# Patient Record
Sex: MALE | Race: WHITE | URBAN - METROPOLITAN AREA
[De-identification: names, ages, dates, MRNs, and addresses within clinical notes are randomized per-mention and may not be internally consistent; named-entity substitution may affect disease eponyms.]

---

## 2020-07-08 ENCOUNTER — OFFICE VISIT (OUTPATIENT)
Dept: URBAN - METROPOLITAN AREA CLINIC 74 | Facility: CLINIC | Age: 45
End: 2020-07-08
Payer: COMMERCIAL

## 2020-07-08 DIAGNOSIS — Z90.49 HISTORY OF CHOLECYSTECTOMY: ICD-10-CM

## 2020-07-08 DIAGNOSIS — R68.81 EARLY SATIETY: ICD-10-CM

## 2020-07-08 DIAGNOSIS — K31.84 GASTROPARESIS: ICD-10-CM

## 2020-07-08 DIAGNOSIS — K58.9 IRRITABLE BOWEL SYNDROME: ICD-10-CM

## 2020-07-08 DIAGNOSIS — K90.41 GLUTEN INTOLERANCE: ICD-10-CM

## 2020-07-08 DIAGNOSIS — K59.01 SLOW TRANSIT CONSTIPATION: ICD-10-CM

## 2020-07-08 DIAGNOSIS — K59.09 COLONIC CONSTIPATION: ICD-10-CM

## 2020-07-08 PROCEDURE — 1036F TOBACCO NON-USER: CPT | Performed by: INTERNAL MEDICINE

## 2020-07-08 PROCEDURE — G8427 DOCREV CUR MEDS BY ELIG CLIN: HCPCS | Performed by: INTERNAL MEDICINE

## 2020-07-08 PROCEDURE — 99213 OFFICE O/P EST LOW 20 MIN: CPT | Performed by: INTERNAL MEDICINE

## 2020-07-08 PROCEDURE — G8419 CALC BMI OUT NRM PARAM NOF/U: HCPCS | Performed by: INTERNAL MEDICINE

## 2020-07-08 RX ORDER — BETHANECHOL CHLORIDE 25 MG/1
1 TABLET 1 HOUR BEFORE OR 2 HOURS AFTER MEALS TABLET ORAL THREE TIMES A DAY
Qty: 90 | Refills: 3 | OUTPATIENT
Start: 2020-07-08

## 2020-07-08 RX ORDER — PREDNISOLONE ORAL 15 MG/5ML
SOLUTION ORAL
Qty: 0 | Refills: 0 | Status: DISCONTINUED | COMMUNITY
Start: 1900-01-01

## 2020-07-08 NOTE — HPI-TODAY'S VISIT:
Today July 8, 2020 the patient returns for a follow-up visit, the patient was last seen in the office February 10, 2020, at that time he claimed that starting a gluten-free diet resolved all his problems including abdominal bloating, abdominal pain, malaise, joint swelling and pain. Currently the patient states that the gastroparesis has become a problem again, the patient was off Urecholine and is now having more regurgitation, early satiety, or occasional nausea with no vomiting.  The patient will restart Urecholine 25 mg 45 minutes cc, he will return to the office in 3 months or as needed.

## 2020-07-08 NOTE — PHYSICAL EXAM CONSTITUTIONAL:
Overweight, well developed, well nourished , in no acute distress , ambulating without difficulty , normal communication ability

## 2021-05-05 ENCOUNTER — OFFICE VISIT (OUTPATIENT)
Dept: URBAN - METROPOLITAN AREA CLINIC 74 | Facility: CLINIC | Age: 46
End: 2021-05-05
Payer: COMMERCIAL

## 2021-05-05 ENCOUNTER — LAB OUTSIDE AN ENCOUNTER (OUTPATIENT)
Dept: URBAN - METROPOLITAN AREA CLINIC 74 | Facility: CLINIC | Age: 46
End: 2021-05-05

## 2021-05-05 ENCOUNTER — WEB ENCOUNTER (OUTPATIENT)
Dept: URBAN - METROPOLITAN AREA CLINIC 74 | Facility: CLINIC | Age: 46
End: 2021-05-05

## 2021-05-05 VITALS
DIASTOLIC BLOOD PRESSURE: 70 MMHG | HEART RATE: 78 BPM | BODY MASS INDEX: 25.54 KG/M2 | SYSTOLIC BLOOD PRESSURE: 119 MMHG | OXYGEN SATURATION: 99 % | WEIGHT: 178.4 LBS | TEMPERATURE: 97.5 F | HEIGHT: 70 IN

## 2021-05-05 DIAGNOSIS — R11.0 NAUSEA: ICD-10-CM

## 2021-05-05 DIAGNOSIS — R14.0 BLOATING: ICD-10-CM

## 2021-05-05 DIAGNOSIS — R68.81 EARLY SATIETY: ICD-10-CM

## 2021-05-05 DIAGNOSIS — K59.09 COLONIC CONSTIPATION: ICD-10-CM

## 2021-05-05 DIAGNOSIS — G89.29 OTHER CHRONIC PAIN: ICD-10-CM

## 2021-05-05 DIAGNOSIS — K58.9 IRRITABLE BOWEL SYNDROME: ICD-10-CM

## 2021-05-05 DIAGNOSIS — K90.41 GLUTEN INTOLERANCE: ICD-10-CM

## 2021-05-05 DIAGNOSIS — K29.30 CHRONIC SUPERFICIAL GASTRITIS WITHOUT BLEEDING: ICD-10-CM

## 2021-05-05 DIAGNOSIS — K31.84 GASTROPARESIS: ICD-10-CM

## 2021-05-05 PROCEDURE — 99214 OFFICE O/P EST MOD 30 MIN: CPT | Performed by: INTERNAL MEDICINE

## 2021-05-05 RX ORDER — PREDNISONE 20 MG/1
1 TABLET TABLET ORAL ONCE A DAY
Status: ACTIVE | COMMUNITY

## 2021-05-05 RX ORDER — BETHANECHOL CHLORIDE 25 MG/1
1 TABLET 1 HOUR BEFORE OR 2 HOURS AFTER MEALS TABLET ORAL THREE TIMES A DAY
Qty: 90 | Refills: 3 | Status: ACTIVE | COMMUNITY
Start: 2020-07-08

## 2021-05-05 RX ORDER — BETHANECHOL CHLORIDE 25 MG/1
1 TABLET 1 HOUR BEFORE OR 2 HOURS AFTER MEALS TABLET ORAL THREE TIMES A DAY
OUTPATIENT
Start: 2020-07-08

## 2021-05-05 RX ORDER — SULFASALAZINE 500 MG/1
1 TABLET TABLET ORAL TWICE A DAY
Status: ACTIVE | COMMUNITY

## 2021-05-05 NOTE — HPI-TODAY'S VISIT:
Today May 5, 2021 the patient returns for a follow-up visit, the patient was last seen in the office on February 10, 2020 with gastroparesis, history of gastritis, gluten intolerance, history of cholecystectomy, chronic pain and the patient was overweight. At the time of the February 10, 2020 visit the patient returns stating that he did see an internal medicine specialist who changed his diet to a gluten-free diet and was doing very well ever since. The patient denied having any abdominal bloating, pain, malaise, joint swelling or pain.  The patient agreed to remain on the diet and return only as needed. Today the patient returns to the office stating that his gastroparesis might be worse, over the last weeks the patient has developed increasing epigastric bloating and postprandial early satiety followed by nausea, he vomited once retained gastric contents.  The patient states that he has been taking the Urecholine 25 mg before meals and at bedtime with no improvement. The patient was recently seen in the emergency room on April 13, 2021 with flank pain at the time the patient had a white cell count of 5.79, hemoglobin 14.4, hematocrit 45.7, the differential showed a decreased absolute lymphocyte count of 0.51, his electrolytes were normal, his glucose was 122, EGFR was normal, there was on a CT scan slight enlargement of left renal cysts with no hydronephrosis or nephrolithiasis, the stomach, small bowel, colon appeared to be normal.  The vasculature was normal and there were no enlarged lymph nodes. The patient states that he had transient constipation and that is gradually getting better. The patient will be recommended to have a nuclear gastric emptying study before we make a change from Urecholine to Reglan, we discussed potential side effects related to the use of Reglan. The patient will return for a follow-up visit after he completes the gastric emptying study.

## 2021-05-05 NOTE — PHYSICAL EXAM CONSTITUTIONAL:
Overweight, well developed, well nourished, in no acute distress, ambulating without difficulty, normal communication ability

## 2021-06-02 ENCOUNTER — OFFICE VISIT (OUTPATIENT)
Dept: URBAN - METROPOLITAN AREA CLINIC 74 | Facility: CLINIC | Age: 46
End: 2021-06-02

## 2021-06-14 ENCOUNTER — OFFICE VISIT (OUTPATIENT)
Dept: URBAN - METROPOLITAN AREA CLINIC 74 | Facility: CLINIC | Age: 46
End: 2021-06-14
Payer: COMMERCIAL

## 2021-06-14 VITALS
SYSTOLIC BLOOD PRESSURE: 117 MMHG | HEIGHT: 70 IN | TEMPERATURE: 98.6 F | WEIGHT: 179.2 LBS | DIASTOLIC BLOOD PRESSURE: 62 MMHG | BODY MASS INDEX: 25.65 KG/M2 | HEART RATE: 78 BPM | OXYGEN SATURATION: 99 %

## 2021-06-14 DIAGNOSIS — K58.9 IRRITABLE BOWEL SYNDROME: ICD-10-CM

## 2021-06-14 DIAGNOSIS — R68.81 EARLY SATIETY: ICD-10-CM

## 2021-06-14 DIAGNOSIS — G89.29 OTHER CHRONIC PAIN: ICD-10-CM

## 2021-06-14 DIAGNOSIS — K31.84 GASTROPARESIS: ICD-10-CM

## 2021-06-14 DIAGNOSIS — K59.09 COLONIC CONSTIPATION: ICD-10-CM

## 2021-06-14 DIAGNOSIS — K29.30 CHRONIC SUPERFICIAL GASTRITIS WITHOUT BLEEDING: ICD-10-CM

## 2021-06-14 DIAGNOSIS — K90.41 GLUTEN INTOLERANCE: ICD-10-CM

## 2021-06-14 DIAGNOSIS — R14.0 BLOATING: ICD-10-CM

## 2021-06-14 DIAGNOSIS — R11.0 NAUSEA: ICD-10-CM

## 2021-06-14 PROCEDURE — 99213 OFFICE O/P EST LOW 20 MIN: CPT | Performed by: INTERNAL MEDICINE

## 2021-06-14 RX ORDER — BETHANECHOL CHLORIDE 25 MG/1
1 TABLET 1 HOUR BEFORE OR 2 HOURS AFTER MEALS TABLET ORAL THREE TIMES A DAY
OUTPATIENT
Start: 2020-07-08

## 2021-06-14 RX ORDER — SULFASALAZINE 500 MG/1
1 TABLET TABLET ORAL TWICE A DAY
Status: ACTIVE | COMMUNITY

## 2021-06-14 RX ORDER — PREDNISONE 20 MG/1
1 TABLET TABLET ORAL ONCE A DAY
Status: ACTIVE | COMMUNITY

## 2021-06-14 RX ORDER — BETHANECHOL CHLORIDE 25 MG/1
1 TABLET 1 HOUR BEFORE OR 2 HOURS AFTER MEALS TABLET ORAL THREE TIMES A DAY
Status: ACTIVE | COMMUNITY
Start: 2020-07-08

## 2021-06-14 NOTE — HPI-TODAY'S VISIT:
Today May 5, 2021 the patient returns for a follow-up visit, the patient was last seen in the office on February 10, 2020 with gastroparesis, history of gastritis, gluten intolerance, history of cholecystectomy, chronic pain and the patient was overweight. At the time of the February 10, 2020 visit the patient returns stating that he did see an internal medicine specialist who changed his diet to a gluten-free diet and was doing very well ever since. The patient denied having any abdominal bloating, pain, malaise, joint swelling or pain.  The patient agreed to remain on the diet and return only as needed. Today the patient returns to the office stating that his gastroparesis might be worse, over the last weeks the patient has developed increasing epigastric bloating and postprandial early satiety followed by nausea, he vomited once retained gastric contents.  The patient states that he has been taking the Urecholine 25 mg before meals and at bedtime with no improvement. The patient was recently seen in the emergency room on April 13, 2021 with flank pain at the time the patient had a white cell count of 5.79, hemoglobin 14.4, hematocrit 45.7, the differential showed a decreased absolute lymphocyte count of 0.51, his electrolytes were normal, his glucose was 122, EGFR was normal, there was on a CT scan slight enlargement of left renal cysts with no hydronephrosis or nephrolithiasis, the stomach, small bowel, colon appeared to be normal.  The vasculature was normal and there were no enlarged lymph nodes. The patient states that he had transient constipation and that is gradually getting better. The patient will be recommended to have a nuclear gastric emptying study before we make a change from Urecholine to Reglan, we discussed potential side effects related to the use of Reglan. The patient will return for a follow-up visit after he completes the gastric emptying study.  Today June 14, 2021 the patient returns for follow-up visit, the patient was last seen in the office on May 5, 2021 stating that in his opinion his gastroparesis was getting worse, the patient developed increasing bloating and postprandial early satiety followed by nausea, the patient had vomited once retained gastric contents.  The patient was taking Urecholine 25 mg before meals and at bedtime.  The patient had been seen in the emergency room on April 13, 2021 with flank pain, at the time he had a normal CBC except for a decreased absolute lymphocyte count of 0.51.  Patient had a normal electrolyte count, his glucose was 122 and he had normal renal function.  The patient had a CT of the abdomen with slight enlargement of the left renal cyst with no hydronephrosis or nephrolithiasis.  Stomach, small bowel, colon appeared to be normal.  The patient had normal vasculature and no enlarged lymph nodes.  At the time the patient complained of slight constipation which had spontaneously improved.  The patient was advised to get a nuclear gastric emptying study.  The gastric emptying study was performed on Rama 3, 2021 and it showed changes compatible with gastroparesis, the patient had abnormal gastric emptying at 3 and 4 hours.  The percentage emptying at 2 hours was 34.7, should be 50% or better, at 3 hours 66.2, should have been over 75% and at 4 hours 77.14 when it should have been over 90%.  Today the patient returns to the office stating that over the last few weeks he has improved, the patient has been following mostly a gastroparesis diet and continues to use the Urecholine 25 mg AC.  He describes his symptomatology as on and off but he is functional, has nausea about once a week with no vomiting, there is no increased heartburn, the abdominal bloating and early satiety fluctuate, he is still able to eat smaller meals with snacks in between without losing any weight.  We discussed treatment including the use of Reglan as well as the potential side effects related to the use of Reglan.  The patient for now would rather stay on the diet and Urecholine as previously prescribed.  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  It seems that that has decreased his stress.  The patient will return for follow-up visit in 3 months or as needed.

## 2021-06-14 NOTE — PHYSICAL EXAM GASTROINTESTINAL
Abdomen , soft, nontender, nondistended , no guarding or rigidity , no masses palpable , normal bowel sounds , Liver and Spleen , no hepatomegaly present , no hepatosplenomegaly , liver nontender , spleen not palpable
0 = independent

## 2021-09-14 ENCOUNTER — OFFICE VISIT (OUTPATIENT)
Dept: URBAN - METROPOLITAN AREA CLINIC 74 | Facility: CLINIC | Age: 46
End: 2021-09-14
Payer: COMMERCIAL

## 2021-09-14 DIAGNOSIS — K58.9 IRRITABLE BOWEL SYNDROME: ICD-10-CM

## 2021-09-14 DIAGNOSIS — R11.0 NAUSEA: ICD-10-CM

## 2021-09-14 DIAGNOSIS — R68.81 EARLY SATIETY: ICD-10-CM

## 2021-09-14 DIAGNOSIS — G89.29 OTHER CHRONIC PAIN: ICD-10-CM

## 2021-09-14 DIAGNOSIS — K29.30 CHRONIC SUPERFICIAL GASTRITIS WITHOUT BLEEDING: ICD-10-CM

## 2021-09-14 DIAGNOSIS — K31.84 GASTROPARESIS: ICD-10-CM

## 2021-09-14 DIAGNOSIS — K90.41 GLUTEN INTOLERANCE: ICD-10-CM

## 2021-09-14 DIAGNOSIS — R14.0 BLOATING: ICD-10-CM

## 2021-09-14 DIAGNOSIS — K59.09 COLONIC CONSTIPATION: ICD-10-CM

## 2021-09-14 PROCEDURE — 99213 OFFICE O/P EST LOW 20 MIN: CPT | Performed by: INTERNAL MEDICINE

## 2021-09-14 RX ORDER — SULFASALAZINE 500 MG/1
1 TABLET TABLET ORAL TWICE A DAY
Status: ACTIVE | COMMUNITY

## 2021-09-14 RX ORDER — BETHANECHOL CHLORIDE 25 MG/1
1 TABLET 1 HOUR BEFORE OR 2 HOURS AFTER MEALS TABLET ORAL THREE TIMES A DAY
Status: ACTIVE | COMMUNITY
Start: 2020-07-08

## 2021-09-14 RX ORDER — BETHANECHOL CHLORIDE 25 MG/1
1 TABLET 1 HOUR BEFORE OR 2 HOURS AFTER MEALS TABLET ORAL THREE TIMES A DAY
Qty: 270 | Refills: 3
Start: 2020-07-08

## 2021-09-14 RX ORDER — PREDNISONE 20 MG/1
1 TABLET TABLET ORAL ONCE A DAY
Status: ACTIVE | COMMUNITY

## 2021-09-14 NOTE — HPI-TODAY'S VISIT:
Today May 5, 2021 the patient returns for a follow-up visit, the patient was last seen in the office on February 10, 2020 with gastroparesis, history of gastritis, gluten intolerance, history of cholecystectomy, chronic pain and the patient was overweight. At the time of the February 10, 2020 visit the patient returns stating that he did see an internal medicine specialist who changed his diet to a gluten-free diet and was doing very well ever since. The patient denied having any abdominal bloating, pain, malaise, joint swelling or pain.  The patient agreed to remain on the diet and return only as needed. Today the patient returns to the office stating that his gastroparesis might be worse, over the last weeks the patient has developed increasing epigastric bloating and postprandial early satiety followed by nausea, he vomited once retained gastric contents.  The patient states that he has been taking the Urecholine 25 mg before meals and at bedtime with no improvement. The patient was recently seen in the emergency room on April 13, 2021 with flank pain at the time the patient had a white cell count of 5.79, hemoglobin 14.4, hematocrit 45.7, the differential showed a decreased absolute lymphocyte count of 0.51, his electrolytes were normal, his glucose was 122, EGFR was normal, there was on a CT scan slight enlargement of left renal cysts with no hydronephrosis or nephrolithiasis, the stomach, small bowel, colon appeared to be normal.  The vasculature was normal and there were no enlarged lymph nodes. The patient states that he had transient constipation and that is gradually getting better. The patient will be recommended to have a nuclear gastric emptying study before we make a change from Urecholine to Reglan, we discussed potential side effects related to the use of Reglan. The patient will return for a follow-up visit after he completes the gastric emptying study.  Today June 14, 2021 the patient returns for follow-up visit, the patient was last seen in the office on May 5, 2021 stating that in his opinion his gastroparesis was getting worse, the patient developed increasing bloating and postprandial early satiety followed by nausea, the patient had vomited once retained gastric contents.  The patient was taking Urecholine 25 mg before meals and at bedtime.  The patient had been seen in the emergency room on April 13, 2021 with flank pain, at the time he had a normal CBC except for a decreased absolute lymphocyte count of 0.51.  Patient had a normal electrolyte count, his glucose was 122 and he had normal renal function.  The patient had a CT of the abdomen with slight enlargement of the left renal cyst with no hydronephrosis or nephrolithiasis.  Stomach, small bowel, colon appeared to be normal.  The patient had normal vasculature and no enlarged lymph nodes.  At the time the patient complained of slight constipation which had spontaneously improved.  The patient was advised to get a nuclear gastric emptying study.  The gastric emptying study was performed on Rama 3, 2021 and it showed changes compatible with gastroparesis, the patient had abnormal gastric emptying at 3 and 4 hours.  The percentage emptying at 2 hours was 34.7, should be 50% or better, at 3 hours 66.2, should have been over 75% and at 4 hours 77.14 when it should have been over 90%.  Today the patient returns to the office stating that over the last few weeks he has improved, the patient has been following mostly a gastroparesis diet and continues to use the Urecholine 25 mg AC.  He describes his symptomatology as on and off but he is functional, has nausea about once a week with no vomiting, there is no increased heartburn, the abdominal bloating and early satiety fluctuate, he is still able to eat smaller meals with snacks in between without losing any weight.  We discussed treatment including the use of Reglan as well as the potential side effects related to the use of Reglan.  The patient for now would rather stay on the diet and Urecholine as previously prescribed.  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  It seems that that has decreased his stress.  The patient will return for follow-up visit in 3 months or as needed. Today September 14, 2021 the patient returns for a follow-up visit, the patient was last seen in the office on June 14, 2021 with gastroparesis, nausea, early satiety and bloating, irritable bowel syndrome, colonic constipation, gluten intolerance, chronic superficial gastritis without bleeding and ulnar chronic pain. At the time of the last visit the patient stated that he had improved over a period of weeks, he was following a gastroparesis diet and continue to take Urecholine 25 mg AC.  He describes his symptomatology as on and off but the patient was highly functional.  The patient had nausea once a week with no vomiting, the patient denies having heartburn, bloating, early satiety which only fluctuated, he was eating smaller meals with snacks in between without losing any weight. We discussed treatment including the use of Reglan, potential side effects to Reglan.  The patient decided to follow the same diet and remain on neuro:.  The patient had just become a .  That decreased his level of stress.  The patient was advised to return for a follow-up visit in 3 months or as needed.  Today the patient returns to the office today in that he seems to be doing well, he has had a few flareups causing nausea and generalized malaise lasting a few days and improved by modifying his diet and taking the Urecholine 25 mg AC.  The patient denied having any acid reflux, any dysphagia, odynophagia, there has been no vomiting and no weight loss.  The patient changed jobs and is working on a project for [employer] which allows him to work from home and has diminished his level of stress.  The patient continues to treat a form of arthritis that causes soft tissue swelling, he takes sulfasalazine and as needed prednisone.  The patient claims that he has a sensitivity to gluten and when he ingests the gluten loaded diet he gets swelling of his joints rather than significant gastrointestinal.  The patient will remain on current medications and diet and will return for a follow-up visit in 6 months.

## 2021-12-14 ENCOUNTER — WEB ENCOUNTER (OUTPATIENT)
Dept: URBAN - METROPOLITAN AREA CLINIC 74 | Facility: CLINIC | Age: 46
End: 2021-12-14

## 2021-12-14 RX ORDER — BETHANECHOL CHLORIDE 25 MG/1
1 TABLET 1 HOUR BEFORE OR 2 HOURS AFTER MEALS TABLET ORAL THREE TIMES A DAY
Qty: 270 | Refills: 3
Start: 2020-07-08

## 2022-03-16 ENCOUNTER — OFFICE VISIT (OUTPATIENT)
Dept: URBAN - METROPOLITAN AREA CLINIC 74 | Facility: CLINIC | Age: 47
End: 2022-03-16
Payer: COMMERCIAL

## 2022-03-16 VITALS
OXYGEN SATURATION: 100 % | HEIGHT: 70 IN | HEART RATE: 65 BPM | WEIGHT: 188 LBS | DIASTOLIC BLOOD PRESSURE: 70 MMHG | BODY MASS INDEX: 26.92 KG/M2 | TEMPERATURE: 97.5 F | SYSTOLIC BLOOD PRESSURE: 122 MMHG

## 2022-03-16 DIAGNOSIS — K90.41 GLUTEN INTOLERANCE: ICD-10-CM

## 2022-03-16 DIAGNOSIS — R11.0 NAUSEA: ICD-10-CM

## 2022-03-16 DIAGNOSIS — R68.81 EARLY SATIETY: ICD-10-CM

## 2022-03-16 DIAGNOSIS — K58.9 IRRITABLE BOWEL SYNDROME: ICD-10-CM

## 2022-03-16 DIAGNOSIS — K59.09 COLONIC CONSTIPATION: ICD-10-CM

## 2022-03-16 DIAGNOSIS — K29.30 CHRONIC SUPERFICIAL GASTRITIS WITHOUT BLEEDING: ICD-10-CM

## 2022-03-16 DIAGNOSIS — K31.84 GASTROPARESIS: ICD-10-CM

## 2022-03-16 DIAGNOSIS — G89.29 OTHER CHRONIC PAIN: ICD-10-CM

## 2022-03-16 DIAGNOSIS — R14.0 BLOATING: ICD-10-CM

## 2022-03-16 PROCEDURE — 99213 OFFICE O/P EST LOW 20 MIN: CPT | Performed by: INTERNAL MEDICINE

## 2022-03-16 RX ORDER — PREDNISONE 20 MG/1
1 TABLET TABLET ORAL ONCE A DAY
Status: ACTIVE | COMMUNITY

## 2022-03-16 RX ORDER — BETHANECHOL CHLORIDE 25 MG/1
1 TABLET 1 HOUR BEFORE OR 2 HOURS AFTER MEALS TABLET ORAL THREE TIMES A DAY
OUTPATIENT
Start: 2020-07-08

## 2022-03-16 RX ORDER — BETHANECHOL CHLORIDE 25 MG/1
1 TABLET 1 HOUR BEFORE OR 2 HOURS AFTER MEALS TABLET ORAL THREE TIMES A DAY
Qty: 270 | Refills: 3 | Status: ACTIVE | COMMUNITY
Start: 2020-07-08

## 2022-03-16 RX ORDER — SULFASALAZINE 500 MG/1
1 TABLET TABLET ORAL TWICE A DAY
Status: ACTIVE | COMMUNITY

## 2022-03-16 NOTE — HPI-TODAY'S VISIT:
Today May 5, 2021 the patient returns for a follow-up visit, the patient was last seen in the office on February 10, 2020 with gastroparesis, history of gastritis, gluten intolerance, history of cholecystectomy, chronic pain and the patient was overweight. At the time of the February 10, 2020 visit the patient returns stating that he did see an internal medicine specialist who changed his diet to a gluten-free diet and was doing very well ever since. The patient denied having any abdominal bloating, pain, malaise, joint swelling or pain.  The patient agreed to remain on the diet and return only as needed. Today the patient returns to the office stating that his gastroparesis might be worse, over the last weeks the patient has developed increasing epigastric bloating and postprandial early satiety followed by nausea, he vomited once retained gastric contents.  The patient states that he has been taking the Urecholine 25 mg before meals and at bedtime with no improvement. The patient was recently seen in the emergency room on April 13, 2021 with flank pain at the time the patient had a white cell count of 5.79, hemoglobin 14.4, hematocrit 45.7, the differential showed a decreased absolute lymphocyte count of 0.51, his electrolytes were normal, his glucose was 122, EGFR was normal, there was on a CT scan slight enlargement of left renal cysts with no hydronephrosis or nephrolithiasis, the stomach, small bowel, colon appeared to be normal.  The vasculature was normal and there were no enlarged lymph nodes. The patient states that he had transient constipation and that is gradually getting better. The patient will be recommended to have a nuclear gastric emptying study before we make a change from Urecholine to Reglan, we discussed potential side effects related to the use of Reglan. The patient will return for a follow-up visit after he completes the gastric emptying study.  Today June 14, 2021 the patient returns for follow-up visit, the patient was last seen in the office on May 5, 2021 stating that in his opinion his gastroparesis was getting worse, the patient developed increasing bloating and postprandial early satiety followed by nausea, the patient had vomited once retained gastric contents.  The patient was taking Urecholine 25 mg before meals and at bedtime.  The patient had been seen in the emergency room on April 13, 2021 with flank pain, at the time he had a normal CBC except for a decreased absolute lymphocyte count of 0.51.  Patient had a normal electrolyte count, his glucose was 122 and he had normal renal function.  The patient had a CT of the abdomen with slight enlargement of the left renal cyst with no hydronephrosis or nephrolithiasis.  Stomach, small bowel, colon appeared to be normal.  The patient had normal vasculature and no enlarged lymph nodes.  At the time the patient complained of slight constipation which had spontaneously improved.  The patient was advised to get a nuclear gastric emptying study.  The gastric emptying study was performed on Rama 3, 2021 and it showed changes compatible with gastroparesis, the patient had abnormal gastric emptying at 3 and 4 hours.  The percentage emptying at 2 hours was 34.7, should be 50% or better, at 3 hours 66.2, should have been over 75% and at 4 hours 77.14 when it should have been over 90%.  Today the patient returns to the office stating that over the last few weeks he has improved, the patient has been following mostly a gastroparesis diet and continues to use the Urecholine 25 mg AC.  He describes his symptomatology as on and off but he is functional, has nausea about once a week with no vomiting, there is no increased heartburn, the abdominal bloating and early satiety fluctuate, he is still able to eat smaller meals with snacks in between without losing any weight.  We discussed treatment including the use of Reglan as well as the potential side effects related to the use of Reglan.  The patient for now would rather stay on the diet and Urecholine as previously prescribed.  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  It seems that that has decreased his stress.  The patient will return for follow-up visit in 3 months or as needed. Today September 14, 2021 the patient returns for a follow-up visit, the patient was last seen in the office on June 14, 2021 with gastroparesis, nausea, early satiety and bloating, irritable bowel syndrome, colonic constipation, gluten intolerance, chronic superficial gastritis without bleeding and ulnar chronic pain. At the time of the last visit the patient stated that he had improved over a period of weeks, he was following a gastroparesis diet and continue to take Urecholine 25 mg AC.  He describes his symptomatology as on and off but the patient was highly functional.  The patient had nausea once a week with no vomiting, the patient denies having heartburn, bloating, early satiety which only fluctuated, he was eating smaller meals with snacks in between without losing any weight. We discussed treatment including the use of Reglan, potential side effects to Reglan.  The patient decided to follow the same diet and remain on neuro:.  The patient had just become a .  That decreased his level of stress.  The patient was advised to return for a follow-up visit in 3 months or as needed.  Today the patient returns to the office today in that he seems to be doing well, he has had a few flareups causing nausea and generalized malaise lasting a few days and improved by modifying his diet and taking the Urecholine 25 mg AC.  The patient denied having any acid reflux, any dysphagia, odynophagia, there has been no vomiting and no weight loss.  The patient changed jobs and is working on a project for [employer] which allows him to work from home and has diminished his level of stress.  The patient continues to treat a form of arthritis that causes soft tissue swelling, he takes sulfasalazine and as needed prednisone.  The patient claims that he has a sensitivity to gluten and when he ingests the gluten loaded diet he gets swelling of his joints rather than significant gastrointestinal.  The patient will remain on current medications and diet and will return for a follow-up visit in 6 months.  The patient a follow-up visit, the patient was last seen in the office on September 14, 2021 with gastroparesis, early satiety and nausea, bloating, irritable bowel syndrome, colonic constipation, gluten intolerance, history of chronic superficial gastritis and chronic pain.  At the time of the last visit the patient appeared to be doing well, he had few flareups causing nausea and generalized malaise lasting a few days, symptoms improved by modifying the patient's diet and taking Urecholine 25 mg AC.  The patient denied having acid reflux, dysphagia, odynophagia, there was no vomiting or weight loss.  The patient continued to treat the form of arthritis causing soft tissue swelling by taking sulfasalazine and as needed prednisone.  The patient claims having a sensitivity to gluten.  It made his joints swell rather than having gastrointestinal symptoms.  The patient was advised to continue with his diet and medications and return for a follow-up visit in 6 months.  Today the patient returns to the office stating that he in the last few weeks started having again epigastric bloating, early satiety, slight nausea with no vomiting, increased gastroesophageal reflux with occasional heartburn, the patient had stopped using the Urecholine as instructed 25 mg AC.  The patient was doing well and decided to hold the medication.  Once the patient started taking the Urecholine as instructed the symptomatology gradually got better.  At this point in time the early satiety, nausea, regurgitation have improved.  The patient states that while he held the medication he was also on vacation and was not following his regular diet and prior to that he was under significant stress due to work related issues.  Currently the patient is getting better.  The patient denies having any nausea or vomiting and bowel movements have almost regularized.  The patient will remain on Urecholine 25 mg AC.  The patient is currently taking sulfasalazine twice a day for joint pain.  No longer takes prednisone or Celebrex.  We discussed the use of nasal spray metoclopramide as a replacement of the Urecholine.  The patient will contact the office to report progress in 10 days, if issues have not resolved we might try the nasal metoclopramide.  The patient will return otherwise for a follow-up visit in 6 months or as needed.  March 28 2022,Addendum to past note,  the patient did clarify that he does tkae Celebrex and Prednisone as needed.

## 2022-03-16 NOTE — PHYSICAL EXAM GASTROINTESTINAL
Abdomen , soft, nontender, nondistended , no guarding or rigidity , no masses palpable , normal bowel sounds , Liver and Spleen , no hepatomegaly present , no hepatosplenomegaly , liver nontender , spleen not palpable, positive sucussion splash.

## 2022-03-26 ENCOUNTER — WEB ENCOUNTER (OUTPATIENT)
Dept: URBAN - METROPOLITAN AREA CLINIC 74 | Facility: CLINIC | Age: 47
End: 2022-03-26

## 2022-09-15 ENCOUNTER — TELEPHONE ENCOUNTER (OUTPATIENT)
Dept: URBAN - METROPOLITAN AREA CLINIC 74 | Facility: CLINIC | Age: 47
End: 2022-09-15

## 2022-09-15 ENCOUNTER — WEB ENCOUNTER (OUTPATIENT)
Dept: URBAN - METROPOLITAN AREA CLINIC 74 | Facility: CLINIC | Age: 47
End: 2022-09-15

## 2022-09-15 ENCOUNTER — OFFICE VISIT (OUTPATIENT)
Dept: URBAN - METROPOLITAN AREA CLINIC 74 | Facility: CLINIC | Age: 47
End: 2022-09-15
Payer: COMMERCIAL

## 2022-09-15 VITALS
HEART RATE: 76 BPM | DIASTOLIC BLOOD PRESSURE: 75 MMHG | WEIGHT: 184 LBS | TEMPERATURE: 97.8 F | BODY MASS INDEX: 26.34 KG/M2 | HEIGHT: 70 IN | OXYGEN SATURATION: 99 % | SYSTOLIC BLOOD PRESSURE: 115 MMHG

## 2022-09-15 DIAGNOSIS — K21.9 GASTROESOPHAGEAL REFLUX DISEASE, UNSPECIFIED WHETHER ESOPHAGITIS PRESENT: ICD-10-CM

## 2022-09-15 DIAGNOSIS — K58.9 IRRITABLE BOWEL SYNDROME: ICD-10-CM

## 2022-09-15 DIAGNOSIS — K90.41 GLUTEN INTOLERANCE: ICD-10-CM

## 2022-09-15 DIAGNOSIS — K31.84 GASTROPARESIS: ICD-10-CM

## 2022-09-15 DIAGNOSIS — K29.30 CHRONIC SUPERFICIAL GASTRITIS WITHOUT BLEEDING: ICD-10-CM

## 2022-09-15 DIAGNOSIS — K59.09 COLONIC CONSTIPATION: ICD-10-CM

## 2022-09-15 DIAGNOSIS — G89.29 OTHER CHRONIC PAIN: ICD-10-CM

## 2022-09-15 DIAGNOSIS — R12 HEARTBURN: ICD-10-CM

## 2022-09-15 PROBLEM — 235595009: Status: ACTIVE | Noted: 2022-09-15

## 2022-09-15 PROCEDURE — 99213 OFFICE O/P EST LOW 20 MIN: CPT | Performed by: INTERNAL MEDICINE

## 2022-09-15 RX ORDER — PREDNISONE 20 MG/1
1 TABLET TABLET ORAL ONCE A DAY
Status: ACTIVE | COMMUNITY

## 2022-09-15 RX ORDER — BETHANECHOL CHLORIDE 25 MG/1
1 TABLET 1 HOUR BEFORE OR 2 HOURS AFTER MEALS TABLET ORAL THREE TIMES A DAY
Status: ACTIVE | COMMUNITY
Start: 2020-07-08

## 2022-09-15 RX ORDER — BETHANECHOL CHLORIDE 25 MG/1
1 TABLET 1 HOUR BEFORE OR 2 HOURS AFTER MEALS TABLET ORAL THREE TIMES A DAY
Qty: 270 | Refills: 3

## 2022-09-15 RX ORDER — PANTOPRAZOLE SODIUM 40 MG/1
TAKE 1 TABLET (40 MG) BY ORAL ROUTE ONCE DAILY FOR 30 DAYS TABLET, DELAYED RELEASE ORAL ONCE A DAY
Qty: 90 | Refills: 1 | OUTPATIENT
Start: 2022-09-15

## 2022-09-15 RX ORDER — SULFASALAZINE 500 MG/1
1 TABLET TABLET ORAL TWICE A DAY
Status: ACTIVE | COMMUNITY

## 2022-09-15 NOTE — HPI-TODAY'S VISIT:
Today May 5, 2021 the patient returns for a follow-up visit, the patient was last seen in the office on February 10, 2020 with gastroparesis, history of gastritis, gluten intolerance, history of cholecystectomy, chronic pain and the patient was overweight. At the time of the February 10, 2020 visit the patient returns stating that he did see an internal medicine specialist who changed his diet to a gluten-free diet and was doing very well ever since. The patient denied having any abdominal bloating, pain, malaise, joint swelling or pain.  The patient agreed to remain on the diet and return only as needed. Today the patient returns to the office stating that his gastroparesis might be worse, over the last weeks the patient has developed increasing epigastric bloating and postprandial early satiety followed by nausea, he vomited once retained gastric contents.  The patient states that he has been taking the Urecholine 25 mg before meals and at bedtime with no improvement. The patient was recently seen in the emergency room on April 13, 2021 with flank pain at the time the patient had a white cell count of 5.79, hemoglobin 14.4, hematocrit 45.7, the differential showed a decreased absolute lymphocyte count of 0.51, his electrolytes were normal, his glucose was 122, EGFR was normal, there was on a CT scan slight enlargement of left renal cysts with no hydronephrosis or nephrolithiasis, the stomach, small bowel, colon appeared to be normal.  The vasculature was normal and there were no enlarged lymph nodes. The patient states that he had transient constipation and that is gradually getting better. The patient will be recommended to have a nuclear gastric emptying study before we make a change from Urecholine to Reglan, we discussed potential side effects related to the use of Reglan. The patient will return for a follow-up visit after he completes the gastric emptying study.  Today June 14, 2021 the patient returns for follow-up visit, the patient was last seen in the office on May 5, 2021 stating that in his opinion his gastroparesis was getting worse, the patient developed increasing bloating and postprandial early satiety followed by nausea, the patient had vomited once retained gastric contents.  The patient was taking Urecholine 25 mg before meals and at bedtime.  The patient had been seen in the emergency room on April 13, 2021 with flank pain, at the time he had a normal CBC except for a decreased absolute lymphocyte count of 0.51.  Patient had a normal electrolyte count, his glucose was 122 and he had normal renal function.  The patient had a CT of the abdomen with slight enlargement of the left renal cyst with no hydronephrosis or nephrolithiasis.  Stomach, small bowel, colon appeared to be normal.  The patient had normal vasculature and no enlarged lymph nodes.  At the time the patient complained of slight constipation which had spontaneously improved.  The patient was advised to get a nuclear gastric emptying study.  The gastric emptying study was performed on Rama 3, 2021 and it showed changes compatible with gastroparesis, the patient had abnormal gastric emptying at 3 and 4 hours.  The percentage emptying at 2 hours was 34.7, should be 50% or better, at 3 hours 66.2, should have been over 75% and at 4 hours 77.14 when it should have been over 90%.  Today the patient returns to the office stating that over the last few weeks he has improved, the patient has been following mostly a gastroparesis diet and continues to use the Urecholine 25 mg AC.  He describes his symptomatology as on and off but he is functional, has nausea about once a week with no vomiting, there is no increased heartburn, the abdominal bloating and early satiety fluctuate, he is still able to eat smaller meals with snacks in between without losing any weight.  We discussed treatment including the use of Reglan as well as the potential side effects related to the use of Reglan.  The patient for now would rather stay on the diet and Urecholine as previously prescribed.  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  It seems that that has decreased his stress.  The patient will return for follow-up visit in 3 months or as needed. Today September 14, 2021 the patient returns for a follow-up visit, the patient was last seen in the office on June 14, 2021 with gastroparesis, nausea, early satiety and bloating, irritable bowel syndrome, colonic constipation, gluten intolerance, chronic superficial gastritis without bleeding and ulnar chronic pain. At the time of the last visit the patient stated that he had improved over a period of weeks, he was following a gastroparesis diet and continue to take Urecholine 25 mg AC.  He describes his symptomatology as on and off but the patient was highly functional.  The patient had nausea once a week with no vomiting, the patient denies having heartburn, bloating, early satiety which only fluctuated, he was eating smaller meals with snacks in between without losing any weight. We discussed treatment including the use of Reglan, potential side effects to Reglan.  The patient decided to follow the same diet and remain on neuro:.  The patient had just become a .  That decreased his level of stress.  The patient was advised to return for a follow-up visit in 3 months or as needed.  Today the patient returns to the office today in that he seems to be doing well, he has had a few flareups causing nausea and generalized malaise lasting a few days and improved by modifying his diet and taking the Urecholine 25 mg AC.  The patient denied having any acid reflux, any dysphagia, odynophagia, there has been no vomiting and no weight loss.  The patient changed jobs and is working on a project for [employer] which allows him to work from home and has diminished his level of stress.  The patient continues to treat a form of arthritis that causes soft tissue swelling, he takes sulfasalazine and as needed prednisone.  The patient claims that he has a sensitivity to gluten and when he ingests the gluten loaded diet he gets swelling of his joints rather than significant gastrointestinal.  The patient will remain on current medications and diet and will return for a follow-up visit in 6 months.  The patient a follow-up visit, the patient was last seen in the office on September 14, 2021 with gastroparesis, early satiety and nausea, bloating, irritable bowel syndrome, colonic constipation, gluten intolerance, history of chronic superficial gastritis and chronic pain.  At the time of the last visit the patient appeared to be doing well, he had few flareups causing nausea and generalized malaise lasting a few days, symptoms improved by modifying the patient's diet and taking Urecholine 25 mg AC.  The patient denied having acid reflux, dysphagia, odynophagia, there was no vomiting or weight loss.  The patient continued to treat the form of arthritis causing soft tissue swelling by taking sulfasalazine and as needed prednisone.  The patient claims having a sensitivity to gluten.  It made his joints swell rather than having gastrointestinal symptoms.  The patient was advised to continue with his diet and medications and return for a follow-up visit in 6 months.  Today the patient returns to the office stating that he in the last few weeks started having again epigastric bloating, early satiety, slight nausea with no vomiting, increased gastroesophageal reflux with occasional heartburn, the patient had stopped using the Urecholine as instructed 25 mg AC.  The patient was doing well and decided to hold the medication.  Once the patient started taking the Urecholine as instructed the symptomatology gradually got better.  At this point in time the early satiety, nausea, regurgitation have improved.  The patient states that while he held the medication he was also on vacation and was not following his regular diet and prior to that he was under significant stress due to work related issues.  Currently the patient is getting better.  The patient denies having any nausea or vomiting and bowel movements have almost regularized.  The patient will remain on Urecholine 25 mg AC.  The patient is currently taking sulfasalazine twice a day for joint pain.  No longer takes prednisone or Celebrex.  We discussed the use of nasal spray metoclopramide as a replacement of the Urecholine.  The patient will contact the office to report progress in 10 days, if issues have not resolved we might try the nasal metoclopramide.  The patient will return otherwise for a follow-up visit in 6 months or as needed.  March 28 2022,Addendum to past note,  the patient did clarify that he does tkae Celebrex and Prednisone as needed.   Today September 15 2022 the patient returns for a follow-up visit, the patient was last seen in the office on March 16, 2022 with gastroparesis and nausea, early satiety and bloating, irritable bowel syndrome, colonic constipation, gluten intolerance, history of chronic superficial gastritis and chronic pain.  At the time of the last visit the patient complained of recurrent epigastric bloating, early satiety and slight nausea with no vomiting, increasing gastroesophageal reflux with occasional heartburn.  The patient had stopped using Urecholine, the patient was taking 25 mg AC.  The patient appeared to be doing well and decided to hold the medication.  Once he restarted the medication he gradually got better and by the time he was seen in the early satiety, nausea and regurgitation had improved.  The patient had gotten off his diet well ".  During the visit the patient stated doing better, denied having any nausea or vomiting and bowel movements have almost regularized.  The patient was advised to take Urecholine 25 mg AC, the patient was on sulfasalazine twice a day for joint pain, no longer took prednisone or Celebrex.  We discussed the nasal use of metoclopramide as a potential replacement for Urecholine.  The patient did turn down the offer and claimed that he would contact the office if not doing better or to reconsider.  The patient was advised to return for a follow-up visit in 6 months or as needed.  Today the patient returns to the office stating that he has been taking the Urecholine sporadically, while on the medication he seems to be doing better, mostly when he takes the Urecholine at bedtime.  In the last few weeks he had a significant change where he developed heartburn, he was rather intense.  He claims that he was able to manage mostly with diet and eats gradually getting better, he denies having any nausea, vomiting, hematemesis, melena, dysphagia or odynophagia.  The patient was taken off time prednisone and Celebrex.  He considered getting an over-the-counter PPI but did not.  Bowel movements have remained mostly stable, while on prednisone he claims that he has better bowel movements.  The patient is taking the sulfasalazine and prednisone for arthritis as prescribed by the patient's rheumatologist.  The patient agreed to follow antireflux measures and diet and will take pantoprazole 40 mg daily.  If not improved within a couple weeks the patient will contact the office so he can be scheduled to have an EGD.  We discussed potential esophageal changes such as Norman's in people with chronic gastroesophageal reflux.  As long as doing well the patient will return for a follow-up visit in 6 months and will remain on Urecholine 25 mg before meals and pantoprazole 40 mg in the a.m. along with a diet.

## 2023-03-15 ENCOUNTER — OFFICE VISIT (OUTPATIENT)
Dept: URBAN - METROPOLITAN AREA CLINIC 74 | Facility: CLINIC | Age: 48
End: 2023-03-15

## 2023-03-23 ENCOUNTER — OFFICE VISIT (OUTPATIENT)
Dept: URBAN - METROPOLITAN AREA CLINIC 74 | Facility: CLINIC | Age: 48
End: 2023-03-23
Payer: COMMERCIAL

## 2023-03-23 VITALS
WEIGHT: 183 LBS | BODY MASS INDEX: 26.2 KG/M2 | TEMPERATURE: 97.3 F | DIASTOLIC BLOOD PRESSURE: 70 MMHG | HEIGHT: 70 IN | SYSTOLIC BLOOD PRESSURE: 124 MMHG

## 2023-03-23 DIAGNOSIS — Z86.010 PERSONAL HISTORY OF COLONIC POLYPS: ICD-10-CM

## 2023-03-23 DIAGNOSIS — K29.30 CHRONIC SUPERFICIAL GASTRITIS WITHOUT BLEEDING: ICD-10-CM

## 2023-03-23 DIAGNOSIS — K58.9 IRRITABLE BOWEL SYNDROME: ICD-10-CM

## 2023-03-23 DIAGNOSIS — K31.84 GASTROPARESIS: ICD-10-CM

## 2023-03-23 DIAGNOSIS — N28.1 RENAL CYST: ICD-10-CM

## 2023-03-23 DIAGNOSIS — G89.29 OTHER CHRONIC PAIN: ICD-10-CM

## 2023-03-23 DIAGNOSIS — R12 HEARTBURN: ICD-10-CM

## 2023-03-23 DIAGNOSIS — K59.09 COLONIC CONSTIPATION: ICD-10-CM

## 2023-03-23 DIAGNOSIS — K42.9 PERIUMBILICAL HERNIA: ICD-10-CM

## 2023-03-23 DIAGNOSIS — K90.41 GLUTEN INTOLERANCE: ICD-10-CM

## 2023-03-23 DIAGNOSIS — K21.9 GASTROESOPHAGEAL REFLUX DISEASE, UNSPECIFIED WHETHER ESOPHAGITIS PRESENT: ICD-10-CM

## 2023-03-23 DIAGNOSIS — E66.3 OVERWEIGHT (BMI 25.0-29.9): ICD-10-CM

## 2023-03-23 DIAGNOSIS — D18.03 LIVER HEMANGIOMA: ICD-10-CM

## 2023-03-23 PROBLEM — 396347007: Status: ACTIVE | Noted: 2023-03-23

## 2023-03-23 PROBLEM — 428283002: Status: ACTIVE | Noted: 2023-03-23

## 2023-03-23 PROBLEM — 16331000: Status: ACTIVE | Noted: 2023-03-23

## 2023-03-23 PROBLEM — 93469006: Status: ACTIVE | Noted: 2023-03-23

## 2023-03-23 PROBLEM — 722223000: Status: ACTIVE | Noted: 2023-03-23

## 2023-03-23 PROCEDURE — 99214 OFFICE O/P EST MOD 30 MIN: CPT | Performed by: INTERNAL MEDICINE

## 2023-03-23 RX ORDER — PANTOPRAZOLE SODIUM 40 MG/1
1 TABLET TABLET, DELAYED RELEASE ORAL ONCE A DAY
Qty: 90 | Refills: 1
Start: 2022-09-15

## 2023-03-23 RX ORDER — PANTOPRAZOLE SODIUM 40 MG/1
TAKE 1 TABLET (40 MG) BY ORAL ROUTE ONCE DAILY FOR 30 DAYS TABLET, DELAYED RELEASE ORAL ONCE A DAY
Qty: 90 | Refills: 1 | Status: ACTIVE | COMMUNITY
Start: 2022-09-15

## 2023-03-23 RX ORDER — BETHANECHOL CHLORIDE 25 MG/1
1 TABLET 1 HOUR BEFORE OR 2 HOURS AFTER MEALS TABLET ORAL THREE TIMES A DAY
Qty: 270 | Refills: 3 | Status: ACTIVE | COMMUNITY

## 2023-03-23 RX ORDER — PREDNISONE 20 MG/1
1 TABLET TABLET ORAL ONCE A DAY
Status: ACTIVE | COMMUNITY

## 2023-03-23 RX ORDER — BETHANECHOL CHLORIDE 25 MG/1
1 TABLET 1 HOUR BEFORE OR 2 HOURS AFTER MEALS TABLET ORAL THREE TIMES A DAY
OUTPATIENT

## 2023-03-23 RX ORDER — SULFASALAZINE 500 MG/1
1 TABLET TABLET ORAL TWICE A DAY
Status: ACTIVE | COMMUNITY

## 2023-03-23 NOTE — PHYSICAL EXAM GASTROINTESTINAL
Abdomen , soft, nontender, nondistended , no guarding or rigidity , no masses palpable , normal bowel sounds , Liver and Spleen,  no hepatosplenomegaly , liver nontender, umbilical hernia present.

## 2023-03-23 NOTE — HPI-TODAY'S VISIT:
Today May 5, 2021 the patient returns for a follow-up visit, the patient was last seen in the office on February 10, 2020 with gastroparesis, history of gastritis, gluten intolerance, history of cholecystectomy, chronic pain and the patient was overweight. At the time of the February 10, 2020 visit the patient returns stating that he did see an internal medicine specialist who changed his diet to a gluten-free diet and was doing very well ever since. The patient denied having any abdominal bloating, pain, malaise, joint swelling or pain.  The patient agreed to remain on the diet and return only as needed. Today the patient returns to the office stating that his gastroparesis might be worse, over the last weeks the patient has developed increasing epigastric bloating and postprandial early satiety followed by nausea, he vomited once retained gastric contents.  The patient states that he has been taking the Urecholine 25 mg before meals and at bedtime with no improvement. The patient was recently seen in the emergency room on April 13, 2021 with flank pain at the time the patient had a white cell count of 5.79, hemoglobin 14.4, hematocrit 45.7, the differential showed a decreased absolute lymphocyte count of 0.51, his electrolytes were normal, his glucose was 122, EGFR was normal, there was on a CT scan slight enlargement of left renal cysts with no hydronephrosis or nephrolithiasis, the stomach, small bowel, colon appeared to be normal.  The vasculature was normal and there were no enlarged lymph nodes. The patient states that he had transient constipation and that is gradually getting better. The patient will be recommended to have a nuclear gastric emptying study before we make a change from Urecholine to Reglan, we discussed potential side effects related to the use of Reglan. The patient will return for a follow-up visit after he completes the gastric emptying study.  Today June 14, 2021 the patient returns for follow-up visit, the patient was last seen in the office on May 5, 2021 stating that in his opinion his gastroparesis was getting worse, the patient developed increasing bloating and postprandial early satiety followed by nausea, the patient had vomited once retained gastric contents.  The patient was taking Urecholine 25 mg before meals and at bedtime.  The patient had been seen in the emergency room on April 13, 2021 with flank pain, at the time he had a normal CBC except for a decreased absolute lymphocyte count of 0.51.  Patient had a normal electrolyte count, his glucose was 122 and he had normal renal function.  The patient had a CT of the abdomen with slight enlargement of the left renal cyst with no hydronephrosis or nephrolithiasis.  Stomach, small bowel, colon appeared to be normal.  The patient had normal vasculature and no enlarged lymph nodes.  At the time the patient complained of slight constipation which had spontaneously improved.  The patient was advised to get a nuclear gastric emptying study.  The gastric emptying study was performed on Rama 3, 2021 and it showed changes compatible with gastroparesis, the patient had abnormal gastric emptying at 3 and 4 hours.  The percentage emptying at 2 hours was 34.7, should be 50% or better, at 3 hours 66.2, should have been over 75% and at 4 hours 77.14 when it should have been over 90%.  Today the patient returns to the office stating that over the last few weeks he has improved, the patient has been following mostly a gastroparesis diet and continues to use the Urecholine 25 mg AC.  He describes his symptomatology as on and off but he is functional, has nausea about once a week with no vomiting, there is no increased heartburn, the abdominal bloating and early satiety fluctuate, he is still able to eat smaller meals with snacks in between without losing any weight.  We discussed treatment including the use of Reglan as well as the potential side effects related to the use of Reglan.  The patient for now would rather stay on the diet and Urecholine as previously prescribed.  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  It seems that that has decreased his stress.  The patient will return for follow-up visit in 3 months or as needed. Today September 14, 2021 the patient returns for a follow-up visit, the patient was last seen in the office on June 14, 2021 with gastroparesis, nausea, early satiety and bloating, irritable bowel syndrome, colonic constipation, gluten intolerance, chronic superficial gastritis without bleeding and ulnar chronic pain. At the time of the last visit the patient stated that he had improved over a period of weeks, he was following a gastroparesis diet and continue to take Urecholine 25 mg AC.  He describes his symptomatology as on and off but the patient was highly functional.  The patient had nausea once a week with no vomiting, the patient denies having heartburn, bloating, early satiety which only fluctuated, he was eating smaller meals with snacks in between without losing any weight. We discussed treatment including the use of Reglan, potential side effects to Reglan.  The patient decided to follow the same diet and remain on neuro:.  The patient had just become a .  That decreased his level of stress.  The patient was advised to return for a follow-up visit in 3 months or as needed.  Today the patient returns to the office today in that he seems to be doing well, he has had a few flareups causing nausea and generalized malaise lasting a few days and improved by modifying his diet and taking the Urecholine 25 mg AC.  The patient denied having any acid reflux, any dysphagia, odynophagia, there has been no vomiting and no weight loss.  The patient changed jobs and is working on a project for [employer] which allows him to work from home and has diminished his level of stress.  The patient continues to treat a form of arthritis that causes soft tissue swelling, he takes sulfasalazine and as needed prednisone.  The patient claims that he has a sensitivity to gluten and when he ingests the gluten loaded diet he gets swelling of his joints rather than significant gastrointestinal.  The patient will remain on current medications and diet and will return for a follow-up visit in 6 months.  The patient a follow-up visit, the patient was last seen in the office on September 14, 2021 with gastroparesis, early satiety and nausea, bloating, irritable bowel syndrome, colonic constipation, gluten intolerance, history of chronic superficial gastritis and chronic pain.  At the time of the last visit the patient appeared to be doing well, he had few flareups causing nausea and generalized malaise lasting a few days, symptoms improved by modifying the patient's diet and taking Urecholine 25 mg AC.  The patient denied having acid reflux, dysphagia, odynophagia, there was no vomiting or weight loss.  The patient continued to treat the form of arthritis causing soft tissue swelling by taking sulfasalazine and as needed prednisone.  The patient claims having a sensitivity to gluten.  It made his joints swell rather than having gastrointestinal symptoms.  The patient was advised to continue with his diet and medications and return for a follow-up visit in 6 months.  Today the patient returns to the office stating that he in the last few weeks started having again epigastric bloating, early satiety, slight nausea with no vomiting, increased gastroesophageal reflux with occasional heartburn, the patient had stopped using the Urecholine as instructed 25 mg AC.  The patient was doing well and decided to hold the medication.  Once the patient started taking the Urecholine as instructed the symptomatology gradually got better.  At this point in time the early satiety, nausea, regurgitation have improved.  The patient states that while he held the medication he was also on vacation and was not following his regular diet and prior to that he was under significant stress due to work related issues.  Currently the patient is getting better.  The patient denies having any nausea or vomiting and bowel movements have almost regularized.  The patient will remain on Urecholine 25 mg AC.  The patient is currently taking sulfasalazine twice a day for joint pain.  No longer takes prednisone or Celebrex.  We discussed the use of nasal spray metoclopramide as a replacement of the Urecholine.  The patient will contact the office to report progress in 10 days, if issues have not resolved we might try the nasal metoclopramide.  The patient will return otherwise for a follow-up visit in 6 months or as needed.  March 28 2022,Addendum to past note,  the patient did clarify that he does tkae Celebrex and Prednisone as needed.   Today September 15 2022 the patient returns for a follow-up visit, the patient was last seen in the office on March 16, 2022 with gastroparesis and nausea, early satiety and bloating, irritable bowel syndrome, colonic constipation, gluten intolerance, history of chronic superficial gastritis and chronic pain.  At the time of the last visit the patient complained of recurrent epigastric bloating, early satiety and slight nausea with no vomiting, increasing gastroesophageal reflux with occasional heartburn.  The patient had stopped using Urecholine, the patient was taking 25 mg AC.  The patient appeared to be doing well and decided to hold the medication.  Once he restarted the medication he gradually got better and by the time he was seen in the early satiety, nausea and regurgitation had improved.  The patient had gotten off his diet well ".  During the visit the patient stated doing better, denied having any nausea or vomiting and bowel movements have almost regularized.  The patient was advised to take Urecholine 25 mg AC, the patient was on sulfasalazine twice a day for joint pain, no longer took prednisone or Celebrex.  We discussed the nasal use of metoclopramide as a potential replacement for Urecholine.  The patient did turn down the offer and claimed that he would contact the office if not doing better or to reconsider.  The patient was advised to return for a follow-up visit in 6 months or as needed.  Today the patient returns to the office stating that he has been taking the Urecholine sporadically, while on the medication he seems to be doing better, mostly when he takes the Urecholine at bedtime.  In the last few weeks he had a significant change where he developed heartburn, he was rather intense.  He claims that he was able to manage mostly with diet and eats gradually getting better, he denies having any nausea, vomiting, hematemesis, melena, dysphagia or odynophagia.  The patient was taken off time prednisone and Celebrex.  He considered getting an over-the-counter PPI but did not.  Bowel movements have remained mostly stable, while on prednisone he claims that he has better bowel movements.  The patient is taking the sulfasalazine and prednisone for arthritis as prescribed by the patient's rheumatologist.  The patient agreed to follow antireflux measures and diet and will take pantoprazole 40 mg daily.  If not improved within a couple weeks the patient will contact the office so he can be scheduled to have an EGD.  We discussed potential esophageal changes such as Norman's in people with chronic gastroesophageal reflux.  As long as doing well the patient will return for a follow-up visit in 6 months and will remain on Urecholine 25 mg before meals and pantoprazole 40 mg in the a.m. along with a diet.  Today March 23, 2023 the patient returns for a follow-up visit, the patient was last seen in the office on September 15, 2022 with gastroesophageal reflux, heartburn, chronic superficial gastritis without bleeding, gastroparesis, IBS, colonic constipation, gluten intolerance and chronic pain.  At the time of the last visit the patient stated that he had been taking Urecholine sporadically, while on the medication he appeared to be doing better, mostly when taking the Urecholine at bedtime.  During the previous weeks he had a significant change, the patient developed heartburn, it was rather intense.  He was able to manage most of the heartburn with diet and appeared to be doing better, he denies having any nausea, vomiting, hematemesis, melena, dysphagia or odynophagia.  The patient was at the time taking prednisone and Celebrex.  He considered getting an over-the-counter PPI but did not.  Bowel movements were normal, while on prednisone the patient was having better bowel movements.  The patient was taking sulfasalazine and prednisone for arthritis as prescribed by the patient's rheumatologist.  The patient agreed to follow antireflux measures and diet and take pantoprazole 40 mg daily.  If not improved within a couple weeks the patient will contact the office and would then be scheduled to have an EGD.  We discussed potential changes related to acid reflux such as Norman's and esophagitis.  The patient also agreed to remain on Urecholine 25 mg before meals and pantoprazole 40 mg in the a.m. along with antireflux measures and diet.  The patient's last EGD performed on May a 20 2018 revealed normal duodenal mucosa, nonspecific gastric inflammation H. pylori negative and squamous epithelium with no diagnostic abnormalities.  The patient returns to the office stating that he is doing well, the patient currently denies having any dysphagia, odynophagia, nausea, vomiting, heartburn.  The patient stated that while drinking wine on a regular basis with meals, ingesting chocolate he did develop heartburn and for a period of time took pantoprazole 40 mg daily with improvement.  Once he modified his diet he no longer had any heartburn and was able to come off the pantoprazole.  Currently he is having regular daily normal bowel movements type IV on the Rowley scale.  The patient denies having any rectal bleeding or hematochezia, there was no abdominal pain.  The patient had lab on March 9, 2023 which revealed a normal CBC, his glucose was 69, he had a high potassium of 5.3, the patient's alkaline phosphatase was 89 with normal bilirubin albumin and proteins as well as globulins, he has a normal sed rate.  A CT scan performed in December 2022 revealed a 1 cm right hepatic hemangioma, this 1 cm lesion could also be a perfusion change.  The gallbladder was surgically absent.  The pancreas, spleen, adrenals, stomach, small bowel and colon were normal.  There were no lymph nodes abnormalities and no vascular abnormalities.  The patient did have a left lower pole renal cyst measuring 4.8 x 4.2 x 4.8 cm.  The patient states that he had a colonoscopy 3 years ago with colorectal surgery, we will obtain the report for review, he states that at the time they removed a couple of benign polyps.  The patient will be contacted with the information we obtained from the colonoscopy report to determine when his follow-up surveillance colonoscopy should be.  The patient will remain on antireflux measures and diet and will take pantoprazole as needed.  No longer is taking the Urecholine and denies having any symptoms of gastroparesis.  The patient continues to take anti-inflammatories, prednisone and sulfasalazine for rheumatoid arthritis.

## 2023-09-20 ENCOUNTER — OFFICE VISIT (OUTPATIENT)
Dept: URBAN - METROPOLITAN AREA CLINIC 74 | Facility: CLINIC | Age: 48
End: 2023-09-20
Payer: COMMERCIAL

## 2023-09-20 VITALS
OXYGEN SATURATION: 99 % | WEIGHT: 184 LBS | BODY MASS INDEX: 26.34 KG/M2 | TEMPERATURE: 97.7 F | HEART RATE: 86 BPM | HEIGHT: 70 IN | SYSTOLIC BLOOD PRESSURE: 118 MMHG | DIASTOLIC BLOOD PRESSURE: 70 MMHG

## 2023-09-20 DIAGNOSIS — Z87.19 HISTORY OF GASTRITIS: ICD-10-CM

## 2023-09-20 DIAGNOSIS — K31.84 GASTROPARESIS: ICD-10-CM

## 2023-09-20 DIAGNOSIS — K21.9 GASTROESOPHAGEAL REFLUX DISEASE, UNSPECIFIED WHETHER ESOPHAGITIS PRESENT: ICD-10-CM

## 2023-09-20 DIAGNOSIS — K58.8 OTHER IRRITABLE BOWEL SYNDROME: ICD-10-CM

## 2023-09-20 PROCEDURE — 99213 OFFICE O/P EST LOW 20 MIN: CPT | Performed by: INTERNAL MEDICINE

## 2023-09-20 RX ORDER — BETHANECHOL CHLORIDE 25 MG/1
1 TABLET 1 HOUR BEFORE OR 2 HOURS AFTER MEALS TABLET ORAL THREE TIMES A DAY
OUTPATIENT

## 2023-09-20 RX ORDER — SULFASALAZINE 500 MG/1
1 TABLET TABLET ORAL ONCE A DAY
Status: ACTIVE | COMMUNITY

## 2023-09-20 RX ORDER — PREDNISONE 20 MG/1
1 TABLET TABLET ORAL ONCE A DAY
Status: ACTIVE | COMMUNITY

## 2023-09-20 RX ORDER — BETHANECHOL CHLORIDE 25 MG/1
1 TABLET 1 HOUR BEFORE OR 2 HOURS AFTER MEALS TABLET ORAL THREE TIMES A DAY
Status: ACTIVE | COMMUNITY

## 2023-09-20 RX ORDER — PANTOPRAZOLE SODIUM 40 MG/1
1 TABLET TABLET, DELAYED RELEASE ORAL ONCE A DAY
Qty: 90 | Refills: 1 | Status: ON HOLD | COMMUNITY
Start: 2022-09-15

## 2023-09-20 NOTE — HPI-TODAY'S VISIT:
Today May 5, 2021 the patient returns for a follow-up visit, the patient was last seen in the office on February 10, 2020 with gastroparesis, history of gastritis, gluten intolerance, history of cholecystectomy, chronic pain and the patient was overweight. At the time of the February 10, 2020 visit the patient returns stating that he did see an internal medicine specialist who changed his diet to a gluten-free diet and was doing very well ever since. The patient denied having any abdominal bloating, pain, malaise, joint swelling or pain.  The patient agreed to remain on the diet and return only as needed. Today the patient returns to the office stating that his gastroparesis might be worse, over the last weeks the patient has developed increasing epigastric bloating and postprandial early satiety followed by nausea, he vomited once retained gastric contents.  The patient states that he has been taking the Urecholine 25 mg before meals and at bedtime with no improvement. The patient was recently seen in the emergency room on April 13, 2021 with flank pain at the time the patient had a white cell count of 5.79, hemoglobin 14.4, hematocrit 45.7, the differential showed a decreased absolute lymphocyte count of 0.51, his electrolytes were normal, his glucose was 122, EGFR was normal, there was on a CT scan slight enlargement of left renal cysts with no hydronephrosis or nephrolithiasis, the stomach, small bowel, colon appeared to be normal.  The vasculature was normal and there were no enlarged lymph nodes. The patient states that he had transient constipation and that is gradually getting better. The patient will be recommended to have a nuclear gastric emptying study before we make a change from Urecholine to Reglan, we discussed potential side effects related to the use of Reglan. The patient will return for a follow-up visit after he completes the gastric emptying study.  Today June 14, 2021 the patient returns for follow-up visit, the patient was last seen in the office on May 5, 2021 stating that in his opinion his gastroparesis was getting worse, the patient developed increasing bloating and postprandial early satiety followed by nausea, the patient had vomited once retained gastric contents.  The patient was taking Urecholine 25 mg before meals and at bedtime.  The patient had been seen in the emergency room on April 13, 2021 with flank pain, at the time he had a normal CBC except for a decreased absolute lymphocyte count of 0.51.  Patient had a normal electrolyte count, his glucose was 122 and he had normal renal function.  The patient had a CT of the abdomen with slight enlargement of the left renal cyst with no hydronephrosis or nephrolithiasis.  Stomach, small bowel, colon appeared to be normal.  The patient had normal vasculature and no enlarged lymph nodes.  At the time the patient complained of slight constipation which had spontaneously improved.  The patient was advised to get a nuclear gastric emptying study.  The gastric emptying study was performed on Rama 3, 2021 and it showed changes compatible with gastroparesis, the patient had abnormal gastric emptying at 3 and 4 hours.  The percentage emptying at 2 hours was 34.7, should be 50% or better, at 3 hours 66.2, should have been over 75% and at 4 hours 77.14 when it should have been over 90%.  Today the patient returns to the office stating that over the last few weeks he has improved, the patient has been following mostly a gastroparesis diet and continues to use the Urecholine 25 mg AC.  He describes his symptomatology as on and off but he is functional, has nausea about once a week with no vomiting, there is no increased heartburn, the abdominal bloating and early satiety fluctuate, he is still able to eat smaller meals with snacks in between without losing any weight.  We discussed treatment including the use of Reglan as well as the potential side effects related to the use of Reglan.  The patient for now would rather stay on the diet and Urecholine as previously prescribed.  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  It seems that that has decreased his stress.  The patient will return for follow-up visit in 3 months or as needed. Today September 14, 2021 the patient returns for a follow-up visit, the patient was last seen in the office on June 14, 2021 with gastroparesis, nausea, early satiety and bloating, irritable bowel syndrome, colonic constipation, gluten intolerance, chronic superficial gastritis without bleeding and ulnar chronic pain. At the time of the last visit the patient stated that he had improved over a period of weeks, he was following a gastroparesis diet and continue to take Urecholine 25 mg AC.  He describes his symptomatology as on and off but the patient was highly functional.  The patient had nausea once a week with no vomiting, the patient denies having heartburn, bloating, early satiety which only fluctuated, he was eating smaller meals with snacks in between without losing any weight. We discussed treatment including the use of Reglan, potential side effects to Reglan.  The patient decided to follow the same diet and remain on neuro:.  The patient had just become a .  That decreased his level of stress.  The patient was advised to return for a follow-up visit in 3 months or as needed.  Today the patient returns to the office today in that he seems to be doing well, he has had a few flareups causing nausea and generalized malaise lasting a few days and improved by modifying his diet and taking the Urecholine 25 mg AC.  The patient denied having any acid reflux, any dysphagia, odynophagia, there has been no vomiting and no weight loss.  The patient changed jobs and is working on a project for [employer] which allows him to work from home and has diminished his level of stress.  The patient continues to treat a form of arthritis that causes soft tissue swelling, he takes sulfasalazine and as needed prednisone.  The patient claims that he has a sensitivity to gluten and when he ingests the gluten loaded diet he gets swelling of his joints rather than significant gastrointestinal.  The patient will remain on current medications and diet and will return for a follow-up visit in 6 months.  The patient a follow-up visit, the patient was last seen in the office on September 14, 2021 with gastroparesis, early satiety and nausea, bloating, irritable bowel syndrome, colonic constipation, gluten intolerance, history of chronic superficial gastritis and chronic pain.  At the time of the last visit the patient appeared to be doing well, he had few flareups causing nausea and generalized malaise lasting a few days, symptoms improved by modifying the patient's diet and taking Urecholine 25 mg AC.  The patient denied having acid reflux, dysphagia, odynophagia, there was no vomiting or weight loss.  The patient continued to treat the form of arthritis causing soft tissue swelling by taking sulfasalazine and as needed prednisone.  The patient claims having a sensitivity to gluten.  It made his joints swell rather than having gastrointestinal symptoms.  The patient was advised to continue with his diet and medications and return for a follow-up visit in 6 months.  Today the patient returns to the office stating that he in the last few weeks started having again epigastric bloating, early satiety, slight nausea with no vomiting, increased gastroesophageal reflux with occasional heartburn, the patient had stopped using the Urecholine as instructed 25 mg AC.  The patient was doing well and decided to hold the medication.  Once the patient started taking the Urecholine as instructed the symptomatology gradually got better.  At this point in time the early satiety, nausea, regurgitation have improved.  The patient states that while he held the medication he was also on vacation and was not following his regular diet and prior to that he was under significant stress due to work related issues.  Currently the patient is getting better.  The patient denies having any nausea or vomiting and bowel movements have almost regularized.  The patient will remain on Urecholine 25 mg AC.  The patient is currently taking sulfasalazine twice a day for joint pain.  No longer takes prednisone or Celebrex.  We discussed the use of nasal spray metoclopramide as a replacement of the Urecholine.  The patient will contact the office to report progress in 10 days, if issues have not resolved we might try the nasal metoclopramide.  The patient will return otherwise for a follow-up visit in 6 months or as needed.  March 28 2022,Addendum to past note,  the patient did clarify that he does tkae Celebrex and Prednisone as needed.   Today September 15 2022 the patient returns for a follow-up visit, the patient was last seen in the office on March 16, 2022 with gastroparesis and nausea, early satiety and bloating, irritable bowel syndrome, colonic constipation, gluten intolerance, history of chronic superficial gastritis and chronic pain.  At the time of the last visit the patient complained of recurrent epigastric bloating, early satiety and slight nausea with no vomiting, increasing gastroesophageal reflux with occasional heartburn.  The patient had stopped using Urecholine, the patient was taking 25 mg AC.  The patient appeared to be doing well and decided to hold the medication.  Once he restarted the medication he gradually got better and by the time he was seen in the early satiety, nausea and regurgitation had improved.  The patient had gotten off his diet well ".  During the visit the patient stated doing better, denied having any nausea or vomiting and bowel movements have almost regularized.  The patient was advised to take Urecholine 25 mg AC, the patient was on sulfasalazine twice a day for joint pain, no longer took prednisone or Celebrex.  We discussed the nasal use of metoclopramide as a potential replacement for Urecholine.  The patient did turn down the offer and claimed that he would contact the office if not doing better or to reconsider.  The patient was advised to return for a follow-up visit in 6 months or as needed.  Today the patient returns to the office stating that he has been taking the Urecholine sporadically, while on the medication he seems to be doing better, mostly when he takes the Urecholine at bedtime.  In the last few weeks he had a significant change where he developed heartburn, he was rather intense.  He claims that he was able to manage mostly with diet and eats gradually getting better, he denies having any nausea, vomiting, hematemesis, melena, dysphagia or odynophagia.  The patient was taken off time prednisone and Celebrex.  He considered getting an over-the-counter PPI but did not.  Bowel movements have remained mostly stable, while on prednisone he claims that he has better bowel movements.  The patient is taking the sulfasalazine and prednisone for arthritis as prescribed by the patient's rheumatologist.  The patient agreed to follow antireflux measures and diet and will take pantoprazole 40 mg daily.  If not improved within a couple weeks the patient will contact the office so he can be scheduled to have an EGD.  We discussed potential esophageal changes such as Norman's in people with chronic gastroesophageal reflux.  As long as doing well the patient will return for a follow-up visit in 6 months and will remain on Urecholine 25 mg before meals and pantoprazole 40 mg in the a.m. along with a diet.  Today March 23, 2023 the patient returns for a follow-up visit, the patient was last seen in the office on September 15, 2022 with gastroesophageal reflux, heartburn, chronic superficial gastritis without bleeding, gastroparesis, IBS, colonic constipation, gluten intolerance and chronic pain.  At the time of the last visit the patient stated that he had been taking Urecholine sporadically, while on the medication he appeared to be doing better, mostly when taking the Urecholine at bedtime.  During the previous weeks he had a significant change, the patient developed heartburn, it was rather intense.  He was able to manage most of the heartburn with diet and appeared to be doing better, he denies having any nausea, vomiting, hematemesis, melena, dysphagia or odynophagia.  The patient was at the time taking prednisone and Celebrex.  He considered getting an over-the-counter PPI but did not.  Bowel movements were normal, while on prednisone the patient was having better bowel movements.  The patient was taking sulfasalazine and prednisone for arthritis as prescribed by the patient's rheumatologist.  The patient agreed to follow antireflux measures and diet and take pantoprazole 40 mg daily.  If not improved within a couple weeks the patient will contact the office and would then be scheduled to have an EGD.  We discussed potential changes related to acid reflux such as Norman's and esophagitis.  The patient also agreed to remain on Urecholine 25 mg before meals and pantoprazole 40 mg in the a.m. along with antireflux measures and diet.  The patient's last EGD performed on May a 20 2018 revealed normal duodenal mucosa, nonspecific gastric inflammation H. pylori negative and squamous epithelium with no diagnostic abnormalities.  The patient returns to the office stating that he is doing well, the patient currently denies having any dysphagia, odynophagia, nausea, vomiting, heartburn.  The patient stated that while drinking wine on a regular basis with meals, ingesting chocolate he did develop heartburn and for a period of time took pantoprazole 40 mg daily with improvement.  Once he modified his diet he no longer had any heartburn and was able to come off the pantoprazole.  Currently he is having regular daily normal bowel movements type IV on the Hilham scale.  The patient denies having any rectal bleeding or hematochezia, there was no abdominal pain.  The patient had lab on March 9, 2023 which revealed a normal CBC, his glucose was 69, he had a high potassium of 5.3, the patient's alkaline phosphatase was 89 with normal bilirubin albumin and proteins as well as globulins, he has a normal sed rate.  A CT scan performed in December 2022 revealed a 1 cm right hepatic hemangioma, this 1 cm lesion could also be a perfusion change.  The gallbladder was surgically absent.  The pancreas, spleen, adrenals, stomach, small bowel and colon were normal.  There were no lymph nodes abnormalities and no vascular abnormalities.  The patient did have a left lower pole renal cyst measuring 4.8 x 4.2 x 4.8 cm.  The patient states that he had a colonoscopy 3 years ago with colorectal surgery, we will obtain the report for review, he states that at the time they removed a couple of benign polyps.  The patient will be contacted with the information we obtained from the colonoscopy report to determine when his follow-up surveillance colonoscopy should be.  The patient will remain on antireflux measures and diet and will take pantoprazole as needed.  No longer is taking the Urecholine and denies having any symptoms of gastroparesis.  The patient continues to take anti-inflammatories, prednisone and sulfasalazine for rheumatoid arthritis.  Today  September 20, 2023 the patient returns for a follow-up visit, the patient was last seen on March 23, 2023 with gastroesophageal reflux, heartburn, chronic superficial gastritis, gastroparesis, IBS, colonic constipation, gluten intolerance, liver hemangioma, renal cyst, the patient was overweight, had a periumbilical hernia and into history of colonic polyps.  At the time of the visit the patient was having type IV stools on the Hilham scale, denied having rectal bleeding, there was no hematochezia, no abdominal pain.  The patient stated that he did have a colonoscopy 3 years before the visit by colorectal surgery.  He stated that the couple polyps were removed.  We requested records from colorectal surgery for review.  The patient at the time denied having any acid reflux, was following antireflux measures and diet was taking pantoprazole as needed, no longer was taking Urecholine, denies any symptoms related to gastroparesis.  The patient at the time was taking anti-inflammatories prednisone and sulfasalazine for rheumatoid arthritis.  His CT scan in 2022 revealed a 1 cm liver hemangioma.  There are no records from colorectal surgery about the previous colonoscopy and pathology.  Today the patient returns to the office stating that he is doing well, in few occasions the patient has had to use Urecholine in the evening, the patient denies having any nausea, vomiting, he stopped drinking coffee and no longer has any heartburn, there has been no dysphagia, odynophagia.  Bowel movements remain type IV on the Hilham scale with no blood.  The patient will return to colorectal surgery to have a surveillance colonoscopy.  In the past he had colon polyps removed by the colorectal surgeon.  The patient currently remains on sulfasalazine prednisone and Celebrex ordered by the rheumatologist, taking the Urecholine 25 mg at bedtime as needed.  The patient's weight remains stable.  The patient will return for a follow-up visit in 1 year or as needed.
No

## 2023-09-20 NOTE — PHYSICAL EXAM HENT:
Head, normocephalic, atraumatic, Face, Face within normal limits, Ears, External ears within normal limits
Length Of Therapy: 10 months
Add High Risk Medication Management Associated Diagnosis?: No
Detail Level: Zone

## 2023-11-27 ENCOUNTER — ERX REFILL RESPONSE (OUTPATIENT)
Dept: URBAN - METROPOLITAN AREA CLINIC 74 | Facility: CLINIC | Age: 48
End: 2023-11-27

## 2023-11-27 RX ORDER — PANTOPRAZOLE SODIUM 40 MG/1
1 TABLET TABLET, DELAYED RELEASE ORAL ONCE A DAY
Qty: 90 | Refills: 1 | OUTPATIENT

## 2023-11-27 RX ORDER — PANTOPRAZOLE SODIUM 40 MG/1
TAKE ONE TABLET BY MOUTH ONE TIME DAILY TABLET, DELAYED RELEASE ORAL
Qty: 90 TABLET | Refills: 0 | OUTPATIENT

## 2024-02-14 ENCOUNTER — OV EP (OUTPATIENT)
Dept: URBAN - METROPOLITAN AREA CLINIC 74 | Facility: CLINIC | Age: 49
End: 2024-02-14
Payer: COMMERCIAL

## 2024-02-14 ENCOUNTER — LAB (OUTPATIENT)
Dept: URBAN - METROPOLITAN AREA CLINIC 74 | Facility: CLINIC | Age: 49
End: 2024-02-14

## 2024-02-14 VITALS
OXYGEN SATURATION: 100 % | BODY MASS INDEX: 25.62 KG/M2 | HEIGHT: 70 IN | DIASTOLIC BLOOD PRESSURE: 78 MMHG | TEMPERATURE: 97.5 F | HEART RATE: 89 BPM | SYSTOLIC BLOOD PRESSURE: 112 MMHG | WEIGHT: 179 LBS

## 2024-02-14 DIAGNOSIS — K31.84 GASTROPARESIS: ICD-10-CM

## 2024-02-14 DIAGNOSIS — K58.1 IBS: ICD-10-CM

## 2024-02-14 DIAGNOSIS — R10.32 LLQ ABDOMINAL PAIN: ICD-10-CM

## 2024-02-14 DIAGNOSIS — K21.9 GASTROESOPHAGEAL REFLUX DISEASE, UNSPECIFIED WHETHER ESOPHAGITIS PRESENT: ICD-10-CM

## 2024-02-14 PROBLEM — 162049009: Status: ACTIVE | Noted: 2024-02-14

## 2024-02-14 PROBLEM — 275552000: Status: ACTIVE | Noted: 2024-02-14

## 2024-02-14 PROBLEM — 448765001: Status: ACTIVE | Noted: 2024-02-14

## 2024-02-14 PROBLEM — 301716002: Status: ACTIVE | Noted: 2024-02-14

## 2024-02-14 PROCEDURE — 99214 OFFICE O/P EST MOD 30 MIN: CPT | Performed by: INTERNAL MEDICINE

## 2024-02-14 RX ORDER — SULFASALAZINE 500 MG/1
1 TABLET TABLET ORAL ONCE A DAY
Status: ACTIVE | COMMUNITY

## 2024-02-14 RX ORDER — PANTOPRAZOLE SODIUM 40 MG/1
TAKE ONE TABLET BY MOUTH ONE TIME DAILY TABLET, DELAYED RELEASE ORAL
Qty: 90 TABLET | Refills: 0 | Status: ACTIVE | COMMUNITY

## 2024-02-14 RX ORDER — BETHANECHOL CHLORIDE 25 MG/1
1 TABLET 1 HOUR BEFORE OR 2 HOURS AFTER MEALS TABLET ORAL THREE TIMES A DAY
Status: ACTIVE | COMMUNITY

## 2024-02-14 RX ORDER — PREDNISONE 20 MG/1
1 TABLET TABLET ORAL ONCE A DAY
Status: ACTIVE | COMMUNITY

## 2024-02-14 RX ORDER — BETHANECHOL CHLORIDE 25 MG/1
1 TABLET 1 HOUR BEFORE OR 2 HOURS AFTER MEALS TABLET ORAL THREE TIMES A DAY
OUTPATIENT

## 2024-02-14 NOTE — PHYSICAL EXAM GASTROINTESTINAL
Abdomen, soft, tender LLQ and Left mid-abdomen, nondistended, no guarding or rigidity, no masses palpable, normal bowel sounds, Liver and Spleen,  no hepatosplenomegaly, liver nontender

## 2024-02-14 NOTE — HPI-TODAY'S VISIT:
Today May 5, 2021 the patient returns for a follow-up visit, the patient was last seen in the office on February 10, 2020 with gastroparesis, history of gastritis, gluten intolerance, history of cholecystectomy, chronic pain and the patient was overweight. At the time of the February 10, 2020 visit the patient returns stating that he did see an internal medicine specialist who changed his diet to a gluten-free diet and was doing very well ever since. The patient denied having any abdominal bloating, pain, malaise, joint swelling or pain.  The patient agreed to remain on the diet and return only as needed. Today the patient returns to the office stating that his gastroparesis might be worse, over the last weeks the patient has developed increasing epigastric bloating and postprandial early satiety followed by nausea, he vomited once retained gastric contents.  The patient states that he has been taking the Urecholine 25 mg before meals and at bedtime with no improvement. The patient was recently seen in the emergency room on April 13, 2021 with flank pain at the time the patient had a white cell count of 5.79, hemoglobin 14.4, hematocrit 45.7, the differential showed a decreased absolute lymphocyte count of 0.51, his electrolytes were normal, his glucose was 122, EGFR was normal, there was on a CT scan slight enlargement of left renal cysts with no hydronephrosis or nephrolithiasis, the stomach, small bowel, colon appeared to be normal.  The vasculature was normal and there were no enlarged lymph nodes. The patient states that he had transient constipation and that is gradually getting better. The patient will be recommended to have a nuclear gastric emptying study before we make a change from Urecholine to Reglan, we discussed potential side effects related to the use of Reglan. The patient will return for a follow-up visit after he completes the gastric emptying study.  Today June 14, 2021 the patient returns for follow-up visit, the patient was last seen in the office on May 5, 2021 stating that in his opinion his gastroparesis was getting worse, the patient developed increasing bloating and postprandial early satiety followed by nausea, the patient had vomited once retained gastric contents.  The patient was taking Urecholine 25 mg before meals and at bedtime.  The patient had been seen in the emergency room on April 13, 2021 with flank pain, at the time he had a normal CBC except for a decreased absolute lymphocyte count of 0.51.  Patient had a normal electrolyte count, his glucose was 122 and he had normal renal function.  The patient had a CT of the abdomen with slight enlargement of the left renal cyst with no hydronephrosis or nephrolithiasis.  Stomach, small bowel, colon appeared to be normal.  The patient had normal vasculature and no enlarged lymph nodes.  At the time the patient complained of slight constipation which had spontaneously improved.  The patient was advised to get a nuclear gastric emptying study.  The gastric emptying study was performed on Rama 3, 2021 and it showed changes compatible with gastroparesis, the patient had abnormal gastric emptying at 3 and 4 hours.  The percentage emptying at 2 hours was 34.7, should be 50% or better, at 3 hours 66.2, should have been over 75% and at 4 hours 77.14 when it should have been over 90%.  Today the patient returns to the office stating that over the last few weeks he has improved, the patient has been following mostly a gastroparesis diet and continues to use the Urecholine 25 mg AC.  He describes his symptomatology as on and off but he is functional, has nausea about once a week with no vomiting, there is no increased heartburn, the abdominal bloating and early satiety fluctuate, he is still able to eat smaller meals with snacks in between without losing any weight.  We discussed treatment including the use of Reglan as well as the potential side effects related to the use of Reglan.  The patient for now would rather stay on the diet and Urecholine as previously prescribed.  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  It seems that that has decreased his stress.  The patient will return for follow-up visit in 3 months or as needed. Today September 14, 2021 the patient returns for a follow-up visit, the patient was last seen in the office on June 14, 2021 with gastroparesis, nausea, early satiety and bloating, irritable bowel syndrome, colonic constipation, gluten intolerance, chronic superficial gastritis without bleeding and ulnar chronic pain. At the time of the last visit the patient stated that he had improved over a period of weeks, he was following a gastroparesis diet and continue to take Urecholine 25 mg AC.  He describes his symptomatology as on and off but the patient was highly functional.  The patient had nausea once a week with no vomiting, the patient denies having heartburn, bloating, early satiety which only fluctuated, he was eating smaller meals with snacks in between without losing any weight. We discussed treatment including the use of Reglan, potential side effects to Reglan.  The patient decided to follow the same diet and remain on neuro:.  The patient had just become a .  That decreased his level of stress.  The patient was advised to return for a follow-up visit in 3 months or as needed.  Today the patient returns to the office today in that he seems to be doing well, he has had a few flareups causing nausea and generalized malaise lasting a few days and improved by modifying his diet and taking the Urecholine 25 mg AC.  The patient denied having any acid reflux, any dysphagia, odynophagia, there has been no vomiting and no weight loss.  The patient changed jobs and is working on a project for [employer] which allows him to work from home and has diminished his level of stress.  The patient continues to treat a form of arthritis that causes soft tissue swelling, he takes sulfasalazine and as needed prednisone.  The patient claims that he has a sensitivity to gluten and when he ingests the gluten loaded diet he gets swelling of his joints rather than significant gastrointestinal.  The patient will remain on current medications and diet and will return for a follow-up visit in 6 months.  The patient a follow-up visit, the patient was last seen in the office on September 14, 2021 with gastroparesis, early satiety and nausea, bloating, irritable bowel syndrome, colonic constipation, gluten intolerance, history of chronic superficial gastritis and chronic pain.  At the time of the last visit the patient appeared to be doing well, he had few flareups causing nausea and generalized malaise lasting a few days, symptoms improved by modifying the patient's diet and taking Urecholine 25 mg AC.  The patient denied having acid reflux, dysphagia, odynophagia, there was no vomiting or weight loss.  The patient continued to treat the form of arthritis causing soft tissue swelling by taking sulfasalazine and as needed prednisone.  The patient claims having a sensitivity to gluten.  It made his joints swell rather than having gastrointestinal symptoms.  The patient was advised to continue with his diet and medications and return for a follow-up visit in 6 months.  Today the patient returns to the office stating that he in the last few weeks started having again epigastric bloating, early satiety, slight nausea with no vomiting, increased gastroesophageal reflux with occasional heartburn, the patient had stopped using the Urecholine as instructed 25 mg AC.  The patient was doing well and decided to hold the medication.  Once the patient started taking the Urecholine as instructed the symptomatology gradually got better.  At this point in time the early satiety, nausea, regurgitation have improved.  The patient states that while he held the medication he was also on vacation and was not following his regular diet and prior to that he was under significant stress due to work related issues.  Currently the patient is getting better.  The patient denies having any nausea or vomiting and bowel movements have almost regularized.  The patient will remain on Urecholine 25 mg AC.  The patient is currently taking sulfasalazine twice a day for joint pain.  No longer takes prednisone or Celebrex.  We discussed the use of nasal spray metoclopramide as a replacement of the Urecholine.  The patient will contact the office to report progress in 10 days, if issues have not resolved we might try the nasal metoclopramide.  The patient will return otherwise for a follow-up visit in 6 months or as needed.  March 28 2022,Addendum to past note,  the patient did clarify that he does tkae Celebrex and Prednisone as needed.   Today September 15 2022 the patient returns for a follow-up visit, the patient was last seen in the office on March 16, 2022 with gastroparesis and nausea, early satiety and bloating, irritable bowel syndrome, colonic constipation, gluten intolerance, history of chronic superficial gastritis and chronic pain.  At the time of the last visit the patient complained of recurrent epigastric bloating, early satiety and slight nausea with no vomiting, increasing gastroesophageal reflux with occasional heartburn.  The patient had stopped using Urecholine, the patient was taking 25 mg AC.  The patient appeared to be doing well and decided to hold the medication.  Once he restarted the medication he gradually got better and by the time he was seen in the early satiety, nausea and regurgitation had improved.  The patient had gotten off his diet well ".  During the visit the patient stated doing better, denied having any nausea or vomiting and bowel movements have almost regularized.  The patient was advised to take Urecholine 25 mg AC, the patient was on sulfasalazine twice a day for joint pain, no longer took prednisone or Celebrex.  We discussed the nasal use of metoclopramide as a potential replacement for Urecholine.  The patient did turn down the offer and claimed that he would contact the office if not doing better or to reconsider.  The patient was advised to return for a follow-up visit in 6 months or as needed.  Today the patient returns to the office stating that he has been taking the Urecholine sporadically, while on the medication he seems to be doing better, mostly when he takes the Urecholine at bedtime.  In the last few weeks he had a significant change where he developed heartburn, he was rather intense.  He claims that he was able to manage mostly with diet and eats gradually getting better, he denies having any nausea, vomiting, hematemesis, melena, dysphagia or odynophagia.  The patient was taken off time prednisone and Celebrex.  He considered getting an over-the-counter PPI but did not.  Bowel movements have remained mostly stable, while on prednisone he claims that he has better bowel movements.  The patient is taking the sulfasalazine and prednisone for arthritis as prescribed by the patient's rheumatologist.  The patient agreed to follow antireflux measures and diet and will take pantoprazole 40 mg daily.  If not improved within a couple weeks the patient will contact the office so he can be scheduled to have an EGD.  We discussed potential esophageal changes such as Norman's in people with chronic gastroesophageal reflux.  As long as doing well the patient will return for a follow-up visit in 6 months and will remain on Urecholine 25 mg before meals and pantoprazole 40 mg in the a.m. along with a diet.  Today March 23, 2023 the patient returns for a follow-up visit, the patient was last seen in the office on September 15, 2022 with gastroesophageal reflux, heartburn, chronic superficial gastritis without bleeding, gastroparesis, IBS, colonic constipation, gluten intolerance and chronic pain.  At the time of the last visit the patient stated that he had been taking Urecholine sporadically, while on the medication he appeared to be doing better, mostly when taking the Urecholine at bedtime.  During the previous weeks he had a significant change, the patient developed heartburn, it was rather intense.  He was able to manage most of the heartburn with diet and appeared to be doing better, he denies having any nausea, vomiting, hematemesis, melena, dysphagia or odynophagia.  The patient was at the time taking prednisone and Celebrex.  He considered getting an over-the-counter PPI but did not.  Bowel movements were normal, while on prednisone the patient was having better bowel movements.  The patient was taking sulfasalazine and prednisone for arthritis as prescribed by the patient's rheumatologist.  The patient agreed to follow antireflux measures and diet and take pantoprazole 40 mg daily.  If not improved within a couple weeks the patient will contact the office and would then be scheduled to have an EGD.  We discussed potential changes related to acid reflux such as Norman's and esophagitis.  The patient also agreed to remain on Urecholine 25 mg before meals and pantoprazole 40 mg in the a.m. along with antireflux measures and diet.  The patient's last EGD performed on May a 20 2018 revealed normal duodenal mucosa, nonspecific gastric inflammation H. pylori negative and squamous epithelium with no diagnostic abnormalities.  The patient returns to the office stating that he is doing well, the patient currently denies having any dysphagia, odynophagia, nausea, vomiting, heartburn.  The patient stated that while drinking wine on a regular basis with meals, ingesting chocolate he did develop heartburn and for a period of time took pantoprazole 40 mg daily with improvement.  Once he modified his diet he no longer had any heartburn and was able to come off the pantoprazole.  Currently he is having regular daily normal bowel movements type IV on the Yutan scale.  The patient denies having any rectal bleeding or hematochezia, there was no abdominal pain.  The patient had lab on March 9, 2023 which revealed a normal CBC, his glucose was 69, he had a high potassium of 5.3, the patient's alkaline phosphatase was 89 with normal bilirubin albumin and proteins as well as globulins, he has a normal sed rate.  A CT scan performed in December 2022 revealed a 1 cm right hepatic hemangioma, this 1 cm lesion could also be a perfusion change.  The gallbladder was surgically absent.  The pancreas, spleen, adrenals, stomach, small bowel and colon were normal.  There were no lymph nodes abnormalities and no vascular abnormalities.  The patient did have a left lower pole renal cyst measuring 4.8 x 4.2 x 4.8 cm.  The patient states that he had a colonoscopy 3 years ago with colorectal surgery, we will obtain the report for review, he states that at the time they removed a couple of benign polyps.  The patient will be contacted with the information we obtained from the colonoscopy report to determine when his follow-up surveillance colonoscopy should be.  The patient will remain on antireflux measures and diet and will take pantoprazole as needed.  No longer is taking the Urecholine and denies having any symptoms of gastroparesis.  The patient continues to take anti-inflammatories, prednisone and sulfasalazine for rheumatoid arthritis.  Today  September 20, 2023 the patient returns for a follow-up visit, the patient was last seen on March 23, 2023 with gastroesophageal reflux, heartburn, chronic superficial gastritis, gastroparesis, IBS, colonic constipation, gluten intolerance, liver hemangioma, renal cyst, the patient was overweight, had a periumbilical hernia and into history of colonic polyps.  At the time of the visit the patient was having type IV stools on the Yutan scale, denied having rectal bleeding, there was no hematochezia, no abdominal pain.  The patient stated that he did have a colonoscopy 3 years before the visit by colorectal surgery.  He stated that the couple polyps were removed.  We requested records from colorectal surgery for review.  The patient at the time denied having any acid reflux, was following antireflux measures and diet was taking pantoprazole as needed, no longer was taking Urecholine, denies any symptoms related to gastroparesis.  The patient at the time was taking anti-inflammatories prednisone and sulfasalazine for rheumatoid arthritis.  His CT scan in 2022 revealed a 1 cm liver hemangioma.  There are no records from colorectal surgery about the previous colonoscopy and pathology.  Today the patient returns to the office stating that he is doing well, in few occasions the patient has had to use Urecholine in the evening, the patient denies having any nausea, vomiting, he stopped drinking coffee and no longer has any heartburn, there has been no dysphagia, odynophagia.  Bowel movements remain type IV on the Yutan scale with no blood.  The patient will return to colorectal surgery to have a surveillance colonoscopy.  In the past he had colon polyps removed by the colorectal surgeon.  The patient currently remains on sulfasalazine prednisone and Celebrex ordered by the rheumatologist, taking the Urecholine 25 mg at bedtime as needed.  The patient's weight remains stable.  The patient will return for a follow-up visit in 1 year or as needed.  Today February 14, 2024 the patient returns for a follow-up visit, the patient was last seen on September 20, 2023 with a history of gastritis, gastroesophageal reflux, heartburn, history of gastroparesis, IBS, gluten intolerance, liver hemangioma, renal cyst, personal history of colonic polyps, a periumbilical abdominal wall hernia and the patient was overweight.  The patient did have a history of chronic pain.At the time of the last visit the patient appeared to be doing well, and certain occasions the patient did have to take Urecholine in the evening, the patient denies having any nausea, vomiting, the patient no longer was drinking coffee and no longer have any heartburn.  The patient denies having any dysphagia, odynophagia, the patient had type IV stools on the Yutan scale with no blood. The patient stated that he would be returning to colorectal surgery to have a surveillance colonoscopy, in the past he had colon polyps removed by the colorectal surgeon. At the time of the visit the patient was taking sulfasalazine, prednisone and Celebrex ordered by rheumatology, Urecholine 25 mg at bedtime as needed. The patient's weight remains stable. At the time the patient was advised to return for a follow-up visit in 1 year or as needed.  Today the patient to the office stating that he did undergo a percutaneous drainage of a left renal cyst, the time they attempted to sclerosis and according to the patient he had intense pain while the procedure being done and it had to be temporarily suspended.  Since then the patient has experienced changes including a change in bowel habit, the patient became constipated and defecated type I stools on the Yutan scale until recent days when he started defecating type V stools on the Yutan scale with no blood, currently he defecates 2-3 times a day.  He claims that prior to defecation he has intense discomfort in the left lower quadrant which gradually improves after passage of flatus and stool.  There has been no rectal bleeding or hematochezia.Along with it the patient continues to have some discomfort in the left mid and left upper abdomen which radiates towards the left flank or potentially the pain is coming from the left flank towards the front.  It is intense intermittent not modified by physical activity, possibly worse when lying on his left side, not associated with any urinary symptoms, there has been maybe a minor fever of 99 degrees twice.  There has been no hematuria.  The patient had slight nausea with no vomiting. The patient's most recent laboratory obtained January 11, 2024 revealed a normal CBC, the patient just had slight decrease on eosinophiles. The patient's basic metabolic profile revealed normal renal function including a creatinine of 0.97 with a normal EGFR and normal electrolytes.  The patient had a CT of the abdomen and pelvis November 2022 which again revealed what appears to be a 1 cm right hepatic hemangioma, and absent gallbladder, normal pancreas spleen and adrenals, normal kidneys. The stomach small bowel were normal, there was no evidence of colitis or diverticulitis and the patient had a small fat-containing noninflamed periumbilical hernia.  There was no lymphadenopathy. A colonoscopy performed June 2019 revealed 2 minuscule descending colon polyps removed with hot forceps, 1 was cauterized mucosa with benign lymphoid follicle and the second 1 revealed serrated crypt suggestive of hyperplastic polyp with no adenomatous changes. The patient has been recommended to get a CBC, CMP, CRP, sedimentation rate, CT of abdomen and pelvis with contrast, if needed he was advised to use a over-the-counter stool softener, currently he is ingesting a low fiber diet with no improvement. Lst 5 pounds since last seen. The patient will be contacted with results and recommendations as soon as available.

## 2024-02-15 LAB
A/G RATIO: 1.9
ABSOLUTE BASOPHILS: 31
ABSOLUTE EOSINOPHILS: 20
ABSOLUTE LYMPHOCYTES: 1112
ABSOLUTE MONOCYTES: 352
ABSOLUTE NEUTROPHILS: 3585
ALBUMIN: 4.5
ALKALINE PHOSPHATASE: 83
ALT (SGPT): 19
AST (SGOT): 15
BASOPHILS: 0.6
BILIRUBIN, TOTAL: 0.4
BUN/CREATININE RATIO: (no result)
BUN: 15
C-REACTIVE PROTEIN, QUANT: 0.2
CALCIUM: 9.9
CARBON DIOXIDE, TOTAL: 31
CHLORIDE: 100
CREATININE: 0.94
EGFR: 98
EOSINOPHILS: 0.4
GLOBULIN, TOTAL: 2.4
GLUCOSE: 93
HEMATOCRIT: 44.5
HEMOGLOBIN: 14.9
LYMPHOCYTES: 21.8
MCH: 28.1
MCHC: 33.5
MCV: 84
MONOCYTES: 6.9
MPV: 11
NEUTROPHILS: 70.3
PLATELET COUNT: 255
POTASSIUM: 4.4
PROTEIN, TOTAL: 6.9
RDW: 12.8
RED BLOOD CELL COUNT: 5.3
SED RATE BY MODIFIED: 11
SODIUM: 138
WHITE BLOOD CELL COUNT: 5.1

## 2024-11-05 ENCOUNTER — WEB ENCOUNTER (OUTPATIENT)
Dept: URBAN - METROPOLITAN AREA CLINIC 74 | Facility: CLINIC | Age: 49
End: 2024-11-05

## 2024-11-05 RX ORDER — SODIUM, POTASSIUM,MAG SULFATES 17.5-3.13G
AS DIRECTED SOLUTION, RECONSTITUTED, ORAL ORAL
OUTPATIENT
Start: 2024-11-08

## 2024-11-08 ENCOUNTER — LAB OUTSIDE AN ENCOUNTER (OUTPATIENT)
Dept: URBAN - METROPOLITAN AREA CLINIC 74 | Facility: CLINIC | Age: 49
End: 2024-11-08

## 2024-11-13 ENCOUNTER — OFFICE VISIT (OUTPATIENT)
Dept: URBAN - METROPOLITAN AREA CLINIC 74 | Facility: CLINIC | Age: 49
End: 2024-11-13
Payer: COMMERCIAL

## 2024-11-13 ENCOUNTER — DASHBOARD ENCOUNTERS (OUTPATIENT)
Age: 49
End: 2024-11-13

## 2024-11-13 ENCOUNTER — LAB OUTSIDE AN ENCOUNTER (OUTPATIENT)
Dept: URBAN - METROPOLITAN AREA CLINIC 74 | Facility: CLINIC | Age: 49
End: 2024-11-13

## 2024-11-13 VITALS
BODY MASS INDEX: 26.77 KG/M2 | DIASTOLIC BLOOD PRESSURE: 70 MMHG | HEIGHT: 70 IN | WEIGHT: 187 LBS | OXYGEN SATURATION: 99 % | HEART RATE: 84 BPM | SYSTOLIC BLOOD PRESSURE: 138 MMHG

## 2024-11-13 DIAGNOSIS — K90.41 GLUTEN INTOLERANCE: ICD-10-CM

## 2024-11-13 DIAGNOSIS — Z87.19 HISTORY OF GASTRITIS: ICD-10-CM

## 2024-11-13 DIAGNOSIS — Z86.0100 HISTORY OF COLON POLYPS: ICD-10-CM

## 2024-11-13 DIAGNOSIS — D18.03 LIVER HEMANGIOMA: ICD-10-CM

## 2024-11-13 PROBLEM — 70861000119106: Status: ACTIVE | Noted: 2024-11-13

## 2024-11-13 PROCEDURE — 99213 OFFICE O/P EST LOW 20 MIN: CPT | Performed by: INTERNAL MEDICINE

## 2024-11-13 RX ORDER — SODIUM, POTASSIUM,MAG SULFATES 17.5-3.13G
AS DIRECTED SOLUTION, RECONSTITUTED, ORAL ORAL
OUTPATIENT
Start: 2024-11-13

## 2024-11-13 NOTE — HPI-TODAY'S VISIT:
Today November 13 2024 the patient returns to the office stating that he is doing very well, the patient was seen by his internist in [place] and after ample discussion was identified as having gluten intolerance, once he modified his diet he no longer had any rheumatological, dermatologic or gastrointestinal symptoms and was able to discontinue most medications except for occasional Celebrex.  Currently he denies having any dysphagia, odynophagia, nausea, vomiting or heartburn.  There has been no hematemesis or melena.  The patient is requesting to have an EGD for Norman's screening in view of the past history of gastroesophageal reflux.  Currently the patient is having type IV stools on the Guthrie scale with no blood, there is no abdominal pain, distention or bloating, there has been no evidence of any lower GI bleeding.  The patient will be scheduled to have a screening colonoscopy in view of the past history of colonic polyps, there is no family history of colon cancer or colon polyps.  Benefits potential complications and alternatives to both procedures were disclosed.  The patient is currently active, going to the gym on a regular basis and as long as he follows a gluten-free diet he is entirely asymptomatic.

## 2024-11-18 ENCOUNTER — OFFICE VISIT (OUTPATIENT)
Dept: URBAN - METROPOLITAN AREA CLINIC 74 | Facility: CLINIC | Age: 49
End: 2024-11-18

## 2025-01-14 ENCOUNTER — WEB ENCOUNTER (OUTPATIENT)
Dept: URBAN - METROPOLITAN AREA CLINIC 74 | Facility: CLINIC | Age: 50
End: 2025-01-14

## 2025-01-14 RX ORDER — SODIUM, POTASSIUM,MAG SULFATES 17.5-3.13G
AS DIRECTED SOLUTION, RECONSTITUTED, ORAL ORAL
Start: 2024-11-13

## 2025-01-28 ENCOUNTER — CLAIMS CREATED FROM THE CLAIM WINDOW (OUTPATIENT)
Dept: URBAN - METROPOLITAN AREA CLINIC 4 | Facility: CLINIC | Age: 50
End: 2025-01-28
Payer: COMMERCIAL

## 2025-01-28 ENCOUNTER — CLAIMS CREATED FROM THE CLAIM WINDOW (OUTPATIENT)
Dept: URBAN - METROPOLITAN AREA SURGERY CENTER 30 | Facility: SURGERY CENTER | Age: 50
End: 2025-01-28

## 2025-01-28 DIAGNOSIS — K31.7 POLYP OF STOMACH AND DUODENUM: ICD-10-CM

## 2025-01-28 DIAGNOSIS — K31.89 OTHER DISEASES OF STOMACH AND DUODENUM: ICD-10-CM

## 2025-01-28 DIAGNOSIS — K21.9 GASTRO-ESOPHAGEAL REFLUX DISEASE WITHOUT ESOPHAGITIS: ICD-10-CM

## 2025-01-28 PROCEDURE — 88305 TISSUE EXAM BY PATHOLOGIST: CPT | Performed by: PATHOLOGY

## 2025-01-28 PROCEDURE — 88312 SPECIAL STAINS GROUP 1: CPT | Performed by: PATHOLOGY

## 2025-01-28 RX ORDER — SODIUM, POTASSIUM,MAG SULFATES 17.5-3.13G
AS DIRECTED SOLUTION, RECONSTITUTED, ORAL ORAL
Status: ACTIVE | COMMUNITY
Start: 2024-11-13

## 2025-02-10 PROBLEM — 84089009: Status: ACTIVE | Noted: 2025-02-10

## 2025-02-10 PROBLEM — 78809005: Status: ACTIVE | Noted: 2025-02-10

## 2025-02-10 PROBLEM — 721691004: Status: ACTIVE | Noted: 2025-02-10

## 2025-02-10 PROBLEM — 691501000119103: Status: ACTIVE | Noted: 2025-02-10

## 2025-02-12 ENCOUNTER — OFFICE VISIT (OUTPATIENT)
Dept: URBAN - METROPOLITAN AREA CLINIC 74 | Facility: CLINIC | Age: 50
End: 2025-02-12
Payer: COMMERCIAL

## 2025-02-12 VITALS
OXYGEN SATURATION: 98 % | HEIGHT: 70 IN | BODY MASS INDEX: 27.49 KG/M2 | SYSTOLIC BLOOD PRESSURE: 124 MMHG | WEIGHT: 192 LBS | DIASTOLIC BLOOD PRESSURE: 82 MMHG | HEART RATE: 76 BPM

## 2025-02-12 DIAGNOSIS — D13.1 BENIGN FUNDIC GLAND POLYPS OF STOMACH: ICD-10-CM

## 2025-02-12 DIAGNOSIS — K21.9 GERD WITHOUT ESOPHAGITIS: ICD-10-CM

## 2025-02-12 DIAGNOSIS — K62.1 HYPERPLASTIC RECTAL POLYP: ICD-10-CM

## 2025-02-12 DIAGNOSIS — D18.03 LIVER HEMANGIOMA: ICD-10-CM

## 2025-02-12 DIAGNOSIS — K44.9 HIATAL HERNIA: ICD-10-CM

## 2025-02-12 DIAGNOSIS — K90.41 GLUTEN INTOLERANCE: ICD-10-CM

## 2025-02-12 PROBLEM — 266435005: Status: ACTIVE | Noted: 2025-02-12

## 2025-02-12 PROCEDURE — 99213 OFFICE O/P EST LOW 20 MIN: CPT | Performed by: INTERNAL MEDICINE

## 2025-02-12 NOTE — HPI-TODAY'S VISIT:
Today November 13 2024 the patient returns to the office stating that he is doing very well, the patient was seen by his internist in [place] and after ample discussion was identified as having gluten intolerance, once he modified his diet he no longer had any rheumatological, dermatologic or gastrointestinal symptoms and was able to discontinue most medications except for occasional Celebrex.  Currently he denies having any dysphagia, odynophagia, nausea, vomiting or heartburn.  There has been no hematemesis or melena.  The patient is requesting to have an EGD for Noramn's screening in view of the past history of gastroesophageal reflux.  Currently the patient is having type IV stools on the Cannon scale with no blood, there is no abdominal pain, distention or bloating, there has been no evidence of any lower GI bleeding.  The patient will be scheduled to have a screening colonoscopy in view of the past history of colonic polyps, there is no family history of colon cancer or colon polyps.  Benefits potential complications and alternatives to both procedures were disclosed.  The patient is currently active, going to the gym on a regular basis and as long as he follows a gluten-free diet he is entirely asymptomatic.  Today February 12, 2025 the patient returns for a follow-up visit, the patient was last seen on November 13, 2024  with a history of gastroesophageal reflux, history of gastritis, gluten intolerance, personal history of colonic polyps and a liver hemangioma.  At the time of the visit the patient was having type IV stools on the Cannon scale with no blood, no abdominal pain distention or bloating, there was no evidence of GI bleeding.  The patient was found to have gluten intolerance by primary care, modified his diet and no longer had any rheumatological, dermatological or upper gastrointestinal symptoms and was able to discontinue most medications except for Celebrex.  At the time the patient requested to get an EGD for Norman's screening in view of the long history of chronic acid reflux.  The patient's past history was significant for colonic polyps.  The patient was scheduled to have both an EGD and a colonoscopy.  Both procedures were performed on January 28, 2025, the colonoscopy revealed small nonbleeding internal hemorrhoids, a 2 mm rectal hyperplastic sessile polyp removed with cold biopsy forceps, the rest of the colonic mucosa appeared to be normal.  The upper endoscopy revealed reflux-like changes in the esophagus with no evidence of Norman's or active esophagitis, small hiatal hernia, benign fundic gland polyp, normal gastric mucosa and normal duodenal mucosa without evidence of celiac disease.  The patient will be recommended to get a follow-up colonoscopy in view of the family history of colon polyps in January 2030.  Today the patient returns to the office stating that he is entirely asymptomatic, denies having any dysphagia, odynophagia, nausea, vomiting, heartburn, abdominal or chest discomfort.  Bowel movements remain type IV on the Cannon scale with no blood.  I discussed with the patient the recent EGD and colonoscopy findings, the patient was made aware that he had some very small internal hemorrhoids with no evidence of any bleeding and a 2 mm rectal hyperplastic entirely benign sessile polyp removed with cold biopsy forceps, the rest of the colonic mucosa was entirely normal.  On the upper endoscopy he was made aware that he had a very small hiatal hernia, benign gastric normal mucosa and normal duodenal mucosa.  The patient had a very small fundic gland polyp.  There is no evidence of celiac disease.  The patient at this point in time is off medications, following antireflux measures and a gluten free diet and appears to be doing extremely well.  The patient will be scheduled to have a surveillance colonoscopy in 5 years in view of the family history.  I reviewed the February 2024 CT scan which again revealed no liver masses or hepatomegaly, the patient had a very small cyst or hemangioma, bile ducts appeared to be normal with no evidence of dilation, the patient had a previous cholecystectomy, the pancreas, spleen, adrenal glands, kidneys were normal except for a small nonobstructive 2.3 cm renal cyst, there is history of left renal cyst drainage.  All vessels including the aorta, mesenteric vessels and veins appeared to be normal, there was no lymphadenopathy, intestines appear to be normal there was no free air or free fluid. The hepatic hemangioma has remained stable in size.  The patient will return for a follow-up visit on an as-needed basis.

## 2025-02-25 ENCOUNTER — OFFICE VISIT (OUTPATIENT)
Dept: URBAN - METROPOLITAN AREA CLINIC 74 | Facility: CLINIC | Age: 50
End: 2025-02-25

## 2025-05-28 ENCOUNTER — WEB ENCOUNTER (OUTPATIENT)
Dept: URBAN - METROPOLITAN AREA CLINIC 74 | Facility: CLINIC | Age: 50
End: 2025-05-28